# Patient Record
Sex: FEMALE | Race: WHITE | NOT HISPANIC OR LATINO | Employment: FULL TIME | URBAN - METROPOLITAN AREA
[De-identification: names, ages, dates, MRNs, and addresses within clinical notes are randomized per-mention and may not be internally consistent; named-entity substitution may affect disease eponyms.]

---

## 2017-01-09 ENCOUNTER — HOSPITAL ENCOUNTER (EMERGENCY)
Facility: CLINIC | Age: 22
Discharge: HOME | End: 2017-01-09
Attending: CLINICAL NURSE SPECIALIST | Admitting: CLINICAL NURSE SPECIALIST
Payer: COMMERCIAL

## 2017-01-09 VITALS
WEIGHT: 160 LBS | SYSTOLIC BLOOD PRESSURE: 147 MMHG | HEIGHT: 65 IN | TEMPERATURE: 98.6 F | RESPIRATION RATE: 16 BRPM | DIASTOLIC BLOOD PRESSURE: 76 MMHG | OXYGEN SATURATION: 99 % | BODY MASS INDEX: 26.66 KG/M2
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
## 2017-01-09 DIAGNOSIS — S16.1XXA CERVICAL STRAIN, INITIAL ENCOUNTER: ICD-10-CM

## 2017-01-09 PROCEDURE — 99282 EMERGENCY DEPT VISIT SF MDM: CPT

## 2017-01-09 RX ORDER — CYCLOBENZAPRINE HCL 10 MG
10 TABLET ORAL 3 TIMES DAILY PRN
Qty: 20 TABLET | Refills: 0 | Status: SHIPPED | OUTPATIENT
Start: 2017-01-09 | End: 2017-01-15

## 2017-01-09 ASSESSMENT — ENCOUNTER SYMPTOMS
NECK PAIN: 1
WEAKNESS: 0
NUMBNESS: 0
CONFUSION: 0
HEADACHES: 1
ABDOMINAL PAIN: 0
VOMITING: 0
BACK PAIN: 0

## 2017-01-09 NOTE — ED AVS SNAPSHOT
Emergency Department    [address] [facility] [address]    Phone:  [phone]    Fax:  [phone]                                       Arlene Cutler   MRN: [id number]    Department:   Emergency Department   Date of Visit:  1/9/2017           After Visit Summary Signature Page     I have received my discharge instructions, and my questions have been answered. I have discussed any challenges I see with this plan with the nurse or doctor.    ..........................................................................................................................................  Patient/Patient Representative Signature      ..........................................................................................................................................  Patient Representative Print Name and Relationship to Patient    ..................................................               ................................................  Date                                            Time    ..........................................................................................................................................  Reviewed by Signature/Title    ...................................................              ..............................................  Date                                                            Time
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
## 2017-01-10 ENCOUNTER — OFFICE VISIT (OUTPATIENT)
Dept: INTERNAL MEDICINE | Facility: CLINIC | Age: 22
End: 2017-01-10
Payer: COMMERCIAL

## 2017-01-10 VITALS
HEIGHT: 65 IN | DIASTOLIC BLOOD PRESSURE: 70 MMHG | WEIGHT: 160.1 LBS | OXYGEN SATURATION: 98 % | TEMPERATURE: 98.2 F | BODY MASS INDEX: 26.67 KG/M2 | SYSTOLIC BLOOD PRESSURE: 102 MMHG | HEART RATE: 92 BPM

## 2017-01-10 DIAGNOSIS — A60.00 RECURRENT GENITAL HERPES: ICD-10-CM

## 2017-01-10 DIAGNOSIS — Z23 NEED FOR PROPHYLACTIC VACCINATION AND INOCULATION AGAINST INFLUENZA: ICD-10-CM

## 2017-01-10 DIAGNOSIS — F41.9 ANXIETY: ICD-10-CM

## 2017-01-10 DIAGNOSIS — Z76.89 ENCOUNTER TO ESTABLISH CARE: Primary | ICD-10-CM

## 2017-01-10 DIAGNOSIS — R87.610 PAPANICOLAOU SMEAR OF CERVIX WITH ATYPICAL SQUAMOUS CELLS OF UNDETERMINED SIGNIFICANCE (ASC-US): ICD-10-CM

## 2017-01-10 PROCEDURE — 90688 IIV4 VACCINE SPLT 0.5 ML IM: CPT | Performed by: INTERNAL MEDICINE

## 2017-01-10 PROCEDURE — 99214 OFFICE O/P EST MOD 30 MIN: CPT | Performed by: INTERNAL MEDICINE

## 2017-01-10 RX ORDER — VALACYCLOVIR HYDROCHLORIDE 500 MG/1
500 TABLET, FILM COATED ORAL 2 TIMES DAILY
Qty: 6 TABLET | Refills: 4 | Status: SHIPPED | OUTPATIENT
Start: 2017-01-10 | End: 2018-01-23

## 2017-01-10 NOTE — MR AVS SNAPSHOT
"              After Visit Summary   1/10/2017    Arlene Cutler    MRN: [id number]           Patient Information     Date Of Birth          1995        Visit Information        Provider Department      1/10/2017 9:00 AM Bri To MD [facility]        Today's Diagnoses     Anxiety    -  1     Need for prophylactic vaccination and inoculation against influenza         Recurrent genital herpes           Care Instructions    Flu shot today.    ---    Refills of Zoloft 50mg sent to pharmacy.    Refills of Valtrex (one tablet twice a day for 3 days, 4 additional refills available).    ---    Come back this fall for repeat pap.         Follow-ups after your visit        Who to contact     If you have questions or need follow up information about today's clinic visit or your schedule please contact [facility] directly at [phone].  Normal or non-critical lab and imaging results will be communicated to you by MyChart, letter or phone within 4 business days after the clinic has received the results. If you do not hear from us within 7 days, please contact the clinic through MyChart or phone. If you have a critical or abnormal lab result, we will notify you by phone as soon as possible.  Submit refill requests through [employer] or call your pharmacy and they will forward the refill request to us. Please allow 3 business days for your refill to be completed.          Additional Information About Your Visit        MyChart Information     [employer] lets you send messages to your doctor, view your test results, renew your prescriptions, schedule appointments and more. To sign up, go to [email or web address] . Click on \"Log in\" on the left side of the screen, which will take you to the Welcome page. Then click on \"Sign up Now\" on the right side of the page.     You will be asked to enter the access code listed below, as well as some personal information. " "Please follow the directions to create your username and password.     Your access code is: [email or web address]  Expires: 2017  8:38 PM     Your access code will  in 90 days. If you need help or a new code, please call your [place] clinic or [phone].        Care EveryWhere ID     This is your Care EveryWhere ID. This could be used by other organizations to access your [place] medical records  [id number]        Your Vitals Were     Pulse Temperature Height    92 98.2  F (36.8  C) (Oral) 5' 4.5\" (1.638 m)    BMI (Body Mass Index) Pulse Oximetry Last Period    27.07 kg/m2 98% 2016 (Approximate)    Breastfeeding?          No         Blood Pressure from Last 3 Encounters:   01/10/17 102/70   17 147/76   08/15/16 130/77    Weight from Last 3 Encounters:   01/10/17 160 lb 1.6 oz (72.621 kg)   17 160 lb (72.576 kg)   08/15/16 162 lb 8 oz (73.71 kg)              We Performed the Following     FLU VACCINE, 3 YRS +, IM (QUADRIVALENT W/PRESERVATIVES/MULTI-DOSE) [58600]          Today's Medication Changes          These changes are accurate as of: 1/10/17  9:30 AM.  If you have any questions, ask your nurse or doctor.               Start taking these medicines.        Dose/Directions    sertraline 50 MG tablet   Commonly known as:  ZOLOFT   Used for:  Anxiety   Started by:  Bri To MD        Dose:  50 mg   Take 1 tablet (50 mg) by mouth daily   Quantity:  90 tablet   Refills:  3       valACYclovir 500 MG tablet   Commonly known as:  VALTREX   Used for:  Recurrent genital herpes   Started by:  Bri To MD        Dose:  500 mg   Take 1 tablet (500 mg) by mouth 2 times daily   Quantity:  6 tablet   Refills:  4            Where to get your medicines      These medications were sent to [facility] [address] [place], [place] - [address] AT [facility]  [address], [facility] [address]     Phone:  [phone]    - sertraline 50 MG " tablet  - valACYclovir 500 MG tablet             Primary Care Provider Office Phone # Fax #    Elsie Bridgett Barragan [phone]       [facility] [address] [facility] [address]        Thank you!     Thank you for choosing [facility]  for your care. Our goal is always to provide you with excellent care. Hearing back from our patients is one way we can continue to improve our services. Please take a few minutes to complete the written survey that you may receive in the mail after your visit with us. Thank you!             Your Updated Medication List - Protect others around you: Learn how to safely use, store and throw away your medicines at www.disposemymeds.org.          This list is accurate as of: 1/10/17  9:30 AM.  Always use your most recent med list.                   Brand Name Dispense Instructions for use    cyclobenzaprine 10 MG tablet    FLEXERIL    20 tablet    Take 1 tablet (10 mg) by mouth 3 times daily as needed for muscle spasms       doxycycline 100 MG ED starter pack    VIBRA-TAB     Take 1 tablet by mouth once       hydrOXYzine 25 MG tablet    ATARAX    60 tablet    Take 1-2 tablets (25-50 mg) by mouth every 6 hours as needed for itching       norgestrel-ethinyl estradiol 0.3-30 MG-MCG per tablet    LO/OVRAL    84 tablet    Take 1 tablet by mouth daily       sertraline 50 MG tablet    ZOLOFT    90 tablet    Take 1 tablet (50 mg) by mouth daily       valACYclovir 500 MG tablet    VALTREX    6 tablet    Take 1 tablet (500 mg) by mouth 2 times daily

## 2017-01-10 NOTE — NURSING NOTE
"Chief Complaint   Patient presents with     Establish Care     Derm Problem     x 3 days. Herpes lesions on B/L buttocks. Would like to discuss further options on preventive measures for it.       Initial /70 mmHg  Pulse 92  Temp(Src) 98.2  F (36.8  C) (Oral)  Ht 5' 4.5\" (1.638 m)  Wt 160 lb 1.6 oz (72.621 kg)  BMI 27.07 kg/m2  SpO2 98%  LMP 12/27/2016 (Approximate)  Breastfeeding? No Estimated body mass index is 27.07 kg/(m^2) as calculated from the following:    Height as of this encounter: 5' 4.5\" (1.638 m).    Weight as of this encounter: 160 lb 1.6 oz (72.621 kg).  BP completed using cuff size: regular    Kaminibose MA      "

## 2017-01-10 NOTE — PATIENT INSTRUCTIONS
Flu shot today.    ---    Refills of Zoloft 50mg sent to pharmacy.    Refills of Valtrex (one tablet twice a day for 3 days, 4 additional refills available).    ---    Come back this fall for repeat pap.

## 2017-01-10 NOTE — PROGRESS NOTES
SUBJECTIVE:                                                      HPI: Arlene Cutler is a pleasant 21 year old female who presents to establish care:    Is concerned about herpes:  - primary genital herpes infection occurred last spring  - since then has had recurrent herpetic lesions on bilateral buttocks   - has occurred 4 times   - significantly more mild than primary herpes infection   - has not been treated for any of these  - is interested in treatment options and how to avoid outbreaks     Also, was trying to wean off of Zoloft last fall - unable to do so:  - on this for anxiety  - would like to stay on 50mg daily - reports that anxiety well-controlled at this dose  - needs refills    Past Medical History   Diagnosis Date     Anxiety      Atypical squamous cells of undetermined significance (ASCUS) on Papanicolaou smear of cervix 08/15/16     repeat in 1 year     Recurrent genital herpes 2016     on PRN treatment; considering chronic suppressive therapy   Re: anxiety: see above   Re: ASCUS - repeat PAP due this fall  Re: recurrent genital herpes: see above    Past Surgical History   Procedure Laterality Date     No history of surgery       Family History   Problem Relation Age of Onset     Type 2 Diabetes Maternal Grandmother      Diet controlled     Hypertension Maternal Grandfather      Hyperlipidemia Paternal Grandmother      Myocardial Infarction No family hx of      CEREBROVASCULAR DISEASE No family hx of      Pacemaker Paternal Grandfather      Coronary Artery Disease Early Onset No family hx of      Colon Cancer Paternal Grandfather      Breast Cancer No family hx of      Ovarian Cancer No family hx of      Social History Main Topics     Smoking status: Never Smoker      Smokeless tobacco: Never Used     Alcohol Use: Yes      Comment: couple drinks/week     Drug Use: No     Sexual Activity:     Partners: Male     Birth Control/ Protection: Condom, OCP     Social History Narrative    Single.     "In college - majoring in kinesiology; hoping to go to grad school in OT.    Working at [employer] while in school.     On Lacrosse team at school.     Allergies   Allergen Reactions     No Known Allergies      Current Outpatient Prescriptions   Medication Sig     sertraline (ZOLOFT) 50 MG tablet Take 1 tablet (50 mg) by mouth daily     valACYclovir (VALTREX) 500 MG tablet Take 1 tablet (500 mg) by mouth 2 times daily     cyclobenzaprine (FLEXERIL) 10 MG tablet Take 1 tablet (10 mg) by mouth 3 times daily as needed for muscle spasms     hydrOXYzine (ATARAX) 25 MG tablet Take 1-2 tablets (25-50 mg) by mouth every 6 hours as needed for itching     norgestrel-ethinyl estradiol (LO/OVRAL) 0.3-30 MG-MCG per tablet Take 1 tablet by mouth daily     doxycycline (VIBRA-TAB) 100 MG Take 1 tablet by mouth once     Immunization History   Administered Date(s) Administered     DPT 1994, 01/19/1995     DTAP (<7y) 09/03/1999     Hepatitis A Vac Ped/Adol-2 Dose 08/23/2012, 04/15/2013     Hepatitis B 1994, 01/19/1995, 06/15/1995     Human Papilloma Virus 01/31/2013, 04/15/2013, 08/09/2013     Influenza (IIV3) 02/19/2007     MMR 12/15/1995, 09/03/1999     Meningococcal (Menactra ) 02/19/2007, 08/23/2012     OPV 1994, 01/19/1995, 06/05/1995, 09/03/1999     TDAP (ADACEL AGES 11-64) 08/23/2012     TDAP (BOOSTRIX AGES 10-64) 02/19/2007     Tetramune (DtP/HIB) 03/16/1995, 12/15/1995     Varicella Not Indicated - By Hx 01/20/2000     OBJECTIVE:                                                      /70 mmHg  Pulse 92  Temp(Src) 98.2  F (36.8  C) (Oral)  Ht 5' 4.5\" (1.638 m)  Wt 160 lb 1.6 oz (72.621 kg)  BMI 27.07 kg/m2  SpO2 98%  LMP 12/27/2016 (Approximate)  Breastfeeding? No  Constitutional: well-appearing    PREVENTATIVE HEALTH                                                      BMI: overweight  Blood pressure: within normal limits   Mammogram: not medically indicated at this time   Pap: see " above  Colonoscopy: not medically indicated at this time   Dexa: not medically indicated at this time   Screening HCV: not medically indicated at this time   Screening cholesterol: not medically indicated at this time   Screening diabetes: not medically indicated at this time   STD testing: no risk factors present  Depression screening: PHQ-2 assessment completed and reviewed - no intervention indicated at this time  Alcohol misuse screening: alcohol use reviewed - no intervention indicated at this time  Immunizations: reviewed; flu shot DUE    ASSESSMENT/PLAN:                                                       (Z76.89) Encounter to establish care  (primary encounter diagnosis)  Comment: PMH, PSH, FH, SH, medications, allergies, immunizations, and preventative health measures reviewed.   Plan: see below for plans.     (A60.00) Recurrent genital herpes  Comment: primary outbreak ~1 year ago; has had 4 breakouts (on bilateral buttocks) since.  Plan:    - discussed PRN vs. Chronic suppressive Rx.    - for now patient would like to proceed with PRN therapy.     - Valtrex 500mg bid x 3 days with 4 additional refills provided.    - if patient goes through all 5 refills within year, then chronic suppressive therapy may be better option.   - discussed with patient that the most common triggers for herpes outbreaks are illness and stress.    - difficult to avoid these, but managing anxiety and good hand hygiene can help.    - patient advised to avoid sexual contact with others while lesions present.    (F41.9) Anxiety  Comment: patient was not able to wean off of Zoloft during fall; would like to stay at 50mg daily.   Plan: refill provided.     (Z23) Need for prophylactic vaccination and inoculation against influenza  Plan: flu shot given today.     (R87.610) Papanicolaou smear of cervix with atypical squamous cells of undetermined significance (ASC-US)  Plan: repeat pap this fall.     The instructions on the AVS were  discussed and explained to the patient. Patient expressed understanding of instructions.    A total of 25 minutes were spent face-to-face with this patient during this encounter and over half of that time was spent on counseling and coordination of care re: above diagnoses and plans of care.     Bri To MD   [address] [address]  T: [phone], F: [phone]
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
## 2017-01-10 NOTE — PROGRESS NOTES
Injectable Influenza Immunization Documentation    1.  Is the person to be vaccinated sick today?  No    2. Does the person to be vaccinated have an allergy to eggs or to a component of the vaccine?  No    3. Has the person to be vaccinated today ever had a serious reaction to influenza vaccine in the past?  No    4. Has the person to be vaccinated ever had Guillain-Seymour syndrome?  No     Form completed by Gibson LONGORIA

## 2017-09-16 ENCOUNTER — HEALTH MAINTENANCE LETTER (OUTPATIENT)
Age: 22
End: 2017-09-16

## 2017-10-01 DIAGNOSIS — N92.6 IRREGULAR PERIODS: ICD-10-CM

## 2017-10-03 DIAGNOSIS — N92.6 IRREGULAR PERIODS: ICD-10-CM

## 2017-10-03 RX ORDER — NORGESTREL AND ETHINYL ESTRADIOL 0.3-0.03MG
KIT ORAL
Qty: 30 TABLET | Refills: 2 | Status: SHIPPED | OUTPATIENT
Start: 2017-10-03 | End: 2017-12-18

## 2017-10-03 RX ORDER — NORGESTREL AND ETHINYL ESTRADIOL 0.3-0.03MG
KIT ORAL
Qty: 84 TABLET | Refills: 2 | OUTPATIENT
Start: 2017-10-03

## 2017-10-03 NOTE — TELEPHONE ENCOUNTER
norgestrel-ethinyl estradiol (LO/OVRAL) 0.3-30 MG-MCG per tablet      Last Written Prescription Date: 8/15/16  Last Fill Quantity: 84, # refills: 4  [facility] prescribing provider:   1/10/17   Pt DUE for next PE Jan 2018    BP Readings from Last 3 Encounters:   01/10/17 102/70   01/09/17 147/76   08/15/16 130/77       (R87.610) Papanicolaou smear of cervix with atypical squamous cells of undetermined significance (ASC-US)  Plan: repeat pap this fall.     Prescription approved for 3 months per [facility] Refill Protocol.

## 2017-12-18 DIAGNOSIS — N92.6 IRREGULAR PERIODS: ICD-10-CM

## 2017-12-18 NOTE — LETTER
[facility]  [address] [id number] [phone]            Arlene Link Omayra  [address]  [facility] [address]        December 19, 2017    Dear Arlene,    While refilling your prescription today, we noticed that you are due for an appointment with your provider.  We will refill your prescription for 30 days, but a follow-up appointment must be made before any additional refills can be approved.     Taking care of your health is important to us and we look forward to seeing you in the near future.  Please call us at [phone] or [phone] (or use Net[employer]enish) to schedule an appointment.     Please disregard this notice if you have already made an appointment.    Sincerely,        [facility]

## 2017-12-19 RX ORDER — NORGESTREL AND ETHINYL ESTRADIOL 0.3-0.03MG
KIT ORAL
Qty: 28 TABLET | Refills: 0 | Status: SHIPPED | OUTPATIENT
Start: 2017-12-19 | End: 2018-01-10

## 2017-12-19 NOTE — TELEPHONE ENCOUNTER
Medication is being filled for 1 time refill only due to:  Patient needs to be seen because it has been more than one year since last visit. letter sent

## 2017-12-26 DIAGNOSIS — F41.9 ANXIETY: ICD-10-CM

## 2018-01-10 DIAGNOSIS — N92.6 IRREGULAR PERIODS: ICD-10-CM

## 2018-01-10 RX ORDER — NORGESTREL AND ETHINYL ESTRADIOL 0.3-0.03MG
KIT ORAL
Qty: 28 TABLET | Refills: 0 | Status: SHIPPED | OUTPATIENT
Start: 2018-01-10 | End: 2018-01-23

## 2018-01-10 NOTE — LETTER
[facility]  [address] [id number] [phone]            Arlene Wheater  [address]  [facility] [address]        January 10, 2018    Dear Arlene,    While refilling your prescription today, we noticed that you are due for an appointment with your provider.  We will refill your prescription for 30 days, but a follow-up appointment must be made before any additional refills can be approved.     Taking care of your health is important to us and we look forward to seeing you in the near future.  Please call us at [phone] or [phone] (or use Say2me) to schedule an appointment.     Please disregard this notice if you have already made an appointment.    Sincerely,        [facility]

## 2018-01-23 ENCOUNTER — RESULT FOLLOW UP (OUTPATIENT)
Dept: INTERNAL MEDICINE | Facility: CLINIC | Age: 23
End: 2018-01-23

## 2018-01-23 ENCOUNTER — OFFICE VISIT (OUTPATIENT)
Dept: INTERNAL MEDICINE | Facility: CLINIC | Age: 23
End: 2018-01-23
Payer: COMMERCIAL

## 2018-01-23 VITALS
HEIGHT: 65 IN | BODY MASS INDEX: 28.67 KG/M2 | WEIGHT: 172.1 LBS | DIASTOLIC BLOOD PRESSURE: 60 MMHG | OXYGEN SATURATION: 98 % | SYSTOLIC BLOOD PRESSURE: 120 MMHG | HEART RATE: 80 BPM | TEMPERATURE: 98.7 F

## 2018-01-23 DIAGNOSIS — A60.00 RECURRENT GENITAL HERPES: ICD-10-CM

## 2018-01-23 DIAGNOSIS — Z11.3 SCREENING EXAMINATION FOR VENEREAL DISEASE: ICD-10-CM

## 2018-01-23 DIAGNOSIS — R87.610 PAPANICOLAOU SMEAR OF CERVIX WITH ATYPICAL SQUAMOUS CELLS OF UNDETERMINED SIGNIFICANCE (ASC-US): ICD-10-CM

## 2018-01-23 DIAGNOSIS — Z00.01 ENCOUNTER FOR ROUTINE ADULT MEDICAL EXAM WITH ABNORMAL FINDINGS: Primary | ICD-10-CM

## 2018-01-23 DIAGNOSIS — F41.9 ANXIETY: ICD-10-CM

## 2018-01-23 DIAGNOSIS — Z23 NEED FOR PROPHYLACTIC VACCINATION AND INOCULATION AGAINST INFLUENZA: ICD-10-CM

## 2018-01-23 DIAGNOSIS — Z12.4 SCREENING FOR CERVICAL CANCER: ICD-10-CM

## 2018-01-23 DIAGNOSIS — Z30.41 ENCOUNTER FOR BIRTH CONTROL PILLS MAINTENANCE: ICD-10-CM

## 2018-01-23 PROCEDURE — 99213 OFFICE O/P EST LOW 20 MIN: CPT | Mod: 25 | Performed by: INTERNAL MEDICINE

## 2018-01-23 PROCEDURE — 99395 PREV VISIT EST AGE 18-39: CPT | Mod: 25 | Performed by: INTERNAL MEDICINE

## 2018-01-23 PROCEDURE — 90686 IIV4 VACC NO PRSV 0.5 ML IM: CPT | Performed by: INTERNAL MEDICINE

## 2018-01-23 PROCEDURE — 87591 N.GONORRHOEAE DNA AMP PROB: CPT | Performed by: INTERNAL MEDICINE

## 2018-01-23 PROCEDURE — 90471 IMMUNIZATION ADMIN: CPT | Performed by: INTERNAL MEDICINE

## 2018-01-23 PROCEDURE — G0124 SCREEN C/V THIN LAYER BY MD: HCPCS | Performed by: INTERNAL MEDICINE

## 2018-01-23 PROCEDURE — G0145 SCR C/V CYTO,THINLAYER,RESCR: HCPCS | Performed by: INTERNAL MEDICINE

## 2018-01-23 PROCEDURE — 87491 CHLMYD TRACH DNA AMP PROBE: CPT | Performed by: INTERNAL MEDICINE

## 2018-01-23 RX ORDER — LIDOCAINE 50 MG/G
OINTMENT TOPICAL
Refills: 1 | COMMUNITY
Start: 2017-04-04 | End: 2019-01-25

## 2018-01-23 RX ORDER — VALACYCLOVIR HYDROCHLORIDE 500 MG/1
500 TABLET, FILM COATED ORAL DAILY
Qty: 90 TABLET | Refills: 3 | Status: SHIPPED | OUTPATIENT
Start: 2018-01-23 | End: 2019-01-14

## 2018-01-23 RX ORDER — SERTRALINE HYDROCHLORIDE 25 MG/1
25 TABLET, FILM COATED ORAL DAILY
Qty: 90 TABLET | Refills: 0 | Status: SHIPPED | OUTPATIENT
Start: 2018-01-23 | End: 2018-04-18

## 2018-01-23 RX ORDER — HYDROXYZINE HYDROCHLORIDE 25 MG/1
25-50 TABLET, FILM COATED ORAL EVERY 6 HOURS PRN
Qty: 60 TABLET | Refills: 0 | Status: SHIPPED | OUTPATIENT
Start: 2018-01-23 | End: 2021-06-23

## 2018-01-23 RX ORDER — ADAPALENE 45 G/G
GEL TOPICAL
COMMUNITY
Start: 2017-04-10

## 2018-01-23 ASSESSMENT — PATIENT HEALTH QUESTIONNAIRE - PHQ9
SUM OF ALL RESPONSES TO PHQ QUESTIONS 1-9: 2
5. POOR APPETITE OR OVEREATING: SEVERAL DAYS

## 2018-01-23 ASSESSMENT — ANXIETY QUESTIONNAIRES
2. NOT BEING ABLE TO STOP OR CONTROL WORRYING: SEVERAL DAYS
6. BECOMING EASILY ANNOYED OR IRRITABLE: SEVERAL DAYS
5. BEING SO RESTLESS THAT IT IS HARD TO SIT STILL: NOT AT ALL
3. WORRYING TOO MUCH ABOUT DIFFERENT THINGS: MORE THAN HALF THE DAYS
7. FEELING AFRAID AS IF SOMETHING AWFUL MIGHT HAPPEN: SEVERAL DAYS
GAD7 TOTAL SCORE: 8
1. FEELING NERVOUS, ANXIOUS, OR ON EDGE: MORE THAN HALF THE DAYS

## 2018-01-23 NOTE — PATIENT INSTRUCTIONS
For recurrent genital herpes recommend DAILY Valtrex 500mg.    ---    To start wean off of Zoloft:    DECREASE dose to 25mg daily x 3 months.    If still doing okay, then we can decrease again to 12.5mg daily.    ---    Refills of hydroxyzine and birth control pill sent to pharmacy.    ---    Pap smear results take ~1 week to come back.

## 2018-01-23 NOTE — LETTER
January 10, 2020      Arlene Cutler  [address]  [facility] [address]    Dear ,      This letter is to remind you that you are due for your follow up PAP smear on or about 01/25/20.    Please call [phone] to schedule your appointment at your earliest convenience.     If you have completed the tests outside of [place], please have the results forwarded to our office. We will update the chart for your primary Physician to review before your next annual physical.     Sincerely,      Your [place] Care Team //[facility]

## 2018-01-23 NOTE — LETTER
01/25/18      Arlene Cutler  [address] [facility] [address]        Dear ,    It was a pleasure seeing you again the other day! I hope this finds you well.    I wanted to let you know that your screens for gonorrhea and chlamydia came back as negative.    Please feel free to contact me with any questions or concerns.      Take care,    Bri To MD   [facility]  [address] [place] [place] [address]  T: [phone], F: [phone]

## 2018-01-23 NOTE — PROGRESS NOTES

## 2018-01-23 NOTE — LETTER
January 31, 2018      Arlene Cutler  [address]  [facility] [address]    Dear ,      This letter is in regards to your recent cervical cancer screening (Pap smear). Your Pap smear result was reported as ASCUS or Atypical Squamous Cells of Undetermined Significance. This means that there were mildly abnormal cells found in the sample that we collected from your cervix, but no cancer cells were found. The vast majority of patients with this result do not have significant cervical abnormalities.     Therefore, current guidelines recommend that you return in one year to repeat your Pap smear.      If you have questions about these results contact [phone].    Sincerely,      Bri To MD/[place]

## 2018-01-24 ASSESSMENT — ANXIETY QUESTIONNAIRES: GAD7 TOTAL SCORE: 8

## 2018-01-24 NOTE — PROGRESS NOTES
"  SUBJECTIVE:                                                      HPI: Arlene Cutler is a pleasant 22 year old female who presents for a physical.    Having difficulty with recurrent genital herpes:  - has been using Valtrex as needed on a frequent basis  - outbreaks not completely suppressed with Valtrex as needed - outbreak is prolonged though low grade  - in sexually active relationship with male partner - wants to reduce risk of passing virus to him    Patient would also like to wean off of Zoloft:  - has been using since high school (~4-5 years) for anxiety  - has tried weaning off in the past - unsuccessful  - feels like she is \"in a good place in life\" and is ready to retry coming off again    Discussed with patient that because she has been on the medication for several years, it is best to wean off slowly over ~6 months (or longer if needed).     Also needs refills of hydroxyzine (as needed for anxiety) and OCP.     ROS:  Constitutional: denies unintentional weight loss or gain; denies fevers, chills, or sweats     Cardiovascular: denies chest pain, palpitations, or edema  Respiratory: denies cough, wheezing, shortness of breath, or dyspnea on exertion  Gastrointestinal: denies nausea, vomiting, constipation, diarrhea, or abdominal pain  Genitourinary: denies urinary frequency, urgency, dysuria, or hematuria  Integumentary: denies rash or pruritus  Musculoskeletal: denies back pain, muscle pain, joint pain, or joint swelling  Neurologic: denies focal weakness, numbness, or tingling  Hematologic/Immunologic: denies history of anemia or blood transfusions  Endocrine: denies heat or cold intolerance; denies polyuria, polydipsia  Psychiatric: see PMH below    Past Medical History:   Diagnosis Date     Anxiety      Recurrent genital herpes      Past Surgical History:   Procedure Laterality Date     NO HISTORY OF SURGERY       Family History   Problem Relation Age of Onset     Type 2 Diabetes Maternal " Grandmother      Diet controlled     Hypertension Maternal Grandfather      Hyperlipidemia Paternal Grandmother      Pacemaker Paternal Grandfather      Colon Cancer Paternal Grandfather      Breast Cancer Maternal Aunt      Myocardial Infarction No family hx of      CEREBROVASCULAR DISEASE No family hx of      Coronary Artery Disease Early Onset No family hx of      Ovarian Cancer No family hx of      Social History Main Topics     Smoking status: Never Smoker     Smokeless tobacco: Never Used     Alcohol use Yes      Comment: couple drinks/week     Drug use: No     Sexual activity: Yes     Partners: Male     Birth control/ protection: Condom, OCP     Social History Narrative    Dating.    Hoping to go into PT.    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          Allergies   Allergen Reactions     No Known Allergies      Current Outpatient Prescriptions   Medication Sig     adapalene (DIFFERIN) 0.1 % gel      lidocaine (XYLOCAINE) 5 % ointment MOLLY TOPICALLY TID     valACYclovir (VALTREX) 500 MG tablet Take 1 tablet (500 mg) by mouth daily     norgestrel-ethinyl estradiol (LOW-OGESTREL) 0.3-30 MG-MCG per tablet Take 1 tablet by mouth daily     hydrOXYzine (ATARAX) 25 MG tablet Take 1-2 tablets (25-50 mg) by mouth every 6 hours as needed for anxiety     sertraline (ZOLOFT) 50 MG tablet TAKE 1 TABLET(50 MG) BY MOUTH DAILY     valACYclovir (VALTREX) 500 MG tablet Take 1 tablet (500 mg) by mouth 2 times daily     Immunization History   Administered Date(s) Administered     DTAP (<7y) 09/03/1999     HEPA 08/23/2012, 04/15/2013     HPV 01/31/2013, 04/15/2013, 08/09/2013     HepB 1994, 01/19/1995, 06/15/1995     Historical DTP/aP 1994, 01/19/1995     Influenza (IIV3) PF 02/19/2007     Influenza Vaccine, 3 YRS +, IM (QUADRIVALENT W/PRESERVATIVES) 01/10/2017     MMR 12/15/1995, 09/03/1999     Meningococcal (Menactra ) 02/19/2007, 08/23/2012     OPV, trivalent, live 1994, 01/19/1995, 06/05/1995, 09/03/1999  "    TDAP Vaccine (Adacel) 08/23/2012     TDAP Vaccine (Boostrix) 02/19/2007     Tetramune (DtP/HIB) 03/16/1995, 12/15/1995     Varicella Pt Report Hx of Varicella/Chicken Pox 01/20/2000     OBJECTIVE:                                                      /60 (BP Location: Left arm, Patient Position: Chair, Cuff Size: Adult Regular)  Pulse 80  Temp 98.7  F (37.1  C) (Oral)  Ht 5' 4.8\" (1.646 m)  Wt 172 lb 1.6 oz (78.1 kg)  LMP 01/02/2018 (Approximate)  SpO2 98%  BMI 28.82 kg/m2  Constitutional: well-appearing  Eyes: normal conjunctivae and lids; pupils equal, round, and reactive to light  Ears, Nose, Mouth, and Throat: normal ears and nose; tympanic membranes visualized and normal; normal lips, teeth, and gums; no oropharyngeal lesions or ulcers  Neck: supple and symmetric; no lymphadenopathy; no thyromegaly or masses  Respiratory: normal respiratory effort; clear to auscultation bilaterally  Cardiovascular: regular rate and rhythm; pedal pulses palpable; no edema  Gastrointestinal: soft, non-tender, non-distended, and bowel sounds present; no organomegaly or masses  Genitourinary: external genitalia, urethral meatus, and vagina normal; cervix visualized and normal in appearance  Musculoskeletal: normal gait and station  Psych: normal judgment and insight; normal mood and affect; recent and remote memory intact; oriented to time, place, and person    PREVENTATIVE HEALTH                                                      BMI: overweight  Blood pressure: within normal limits   Breast CA screening: not medically indicated at this time   Cervical CA screening: DUE - ASCUS in 2016  Colon CA screening: not medically indicated at this time   Lung CA screening: n/a   Dexa: not medically indicated at this time   Screening HCV: n/a   Screening cholesterol: not medically indicated at this time   Screening diabetes: not medically indicated at this time   STD testing: DUE  Depression screening: PHQ-2 assessment " completed and reviewed - no intervention indicated at this time  Alcohol misuse screening: alcohol use reviewed - no intervention indicated at this time  Immunizations: reviewed; flu shot DUE    ASSESSMENT/PLAN:                                                       (Z00.01) Encounter for routine adult medical exam with abnormal findings  (primary encounter diagnosis)  Comment: PMH, PSH, FH, SH, medications, allergies, immunizations, and preventative health measures reviewed.   Plan: see below for plans.     (Z12.4) Screening for cervical cancer  (R87.610) Papanicolaou smear of cervix with atypical squamous cells of undetermined significance (ASC-US)  Plan: pap smear obtained today.     (Z23) Need for prophylactic vaccination and inoculation against influenza  Plan: flu shot given today.     (Z11.3) Screening examination for venereal disease  Comment: asymptomatic.   Plan: swab for GC/C obtained.     (A60.00) Recurrent genital herpes  Comment:    - suboptimal control with Valtrex as needed.    - recommend chronic suppressive therapy.   Plan: START Valtrex 500mg daily continuously.     (F41.9) Anxiety  Comment:    - patient would like to try to come off Zoloft.    - recommend slow wean in light of length of time she has been on medication.   Plan:    - DECREASE Zoloft from 50 to 25mg x 3 months.   - if still doing well after 3 months, recommend decreasing to 12.5mg daily.   - refill of hydroxyzine provided.    - if needing frequently, should consider re-increasing Zoloft.     (Z30.41) Encounter for birth control pills maintenance  Plan: refill of OCP provided.     The instructions on the AVS were discussed and explained to the patient. Patient expressed understanding of instructions.    (Chart documentation was completed, in part, with [employer] voice-recognition software. Even though reviewed, some grammatical, spelling, and word errors may remain.)    Bri To MD   [facility] -  [address] [place] [address]  T: [phone], F: [phone]

## 2018-01-25 LAB
C TRACH DNA SPEC QL NAA+PROBE: NEGATIVE
N GONORRHOEA DNA SPEC QL NAA+PROBE: NEGATIVE
SPECIMEN SOURCE: NORMAL
SPECIMEN SOURCE: NORMAL

## 2018-01-26 LAB
COPATH REPORT: ABNORMAL
PAP: ABNORMAL

## 2018-01-29 PROBLEM — R87.610 PAPANICOLAOU SMEAR OF CERVIX WITH ATYPICAL SQUAMOUS CELLS OF UNDETERMINED SIGNIFICANCE (ASC-US): Status: ACTIVE | Noted: 2018-01-23

## 2018-01-29 NOTE — PROGRESS NOTES
08/15/16: Ascus pap. Plan pap in 1 year.  01/23/18: Ascus pap in a 21 yo. Plan pap in 1 year. Pt was advised.  01/31/18 Result letter sent at request of RN. ([facility])   1/25/19 Dx NIL pap. Plan 1 year pap ([facility])  01/10/20 MyChart pap reminder message sent. ([facility])  1/29/20 NIL pap. Plan 3 year pap ([facility]aldo)

## 2018-04-18 DIAGNOSIS — F41.9 ANXIETY: ICD-10-CM

## 2018-04-18 RX ORDER — SERTRALINE HYDROCHLORIDE 25 MG/1
12.5 TABLET, FILM COATED ORAL DAILY
Qty: 45 TABLET | Refills: 0 | Status: SHIPPED | OUTPATIENT
Start: 2018-04-18 | End: 2018-07-12

## 2018-04-18 NOTE — TELEPHONE ENCOUNTER
Per last office visit:    Plan:                           - DECREASE Zoloft from 50 to 25mg x 3 months.                          - if still doing well after 3 months, recommend decreasing to 12.5mg daily.                          - refill of hydroxyzine provided.                                                  - if needing frequently, should consider re-increasing Zoloft.     Pt is doing well on 25mg dose and is ready to decrease to 12.5mg.  Needs new script sent to pharmacy.

## 2018-04-18 NOTE — TELEPHONE ENCOUNTER
Left message for patient to call back.  Per last office visit (1/23), patient was weaning off Zoloft.

## 2018-05-10 ENCOUNTER — OFFICE VISIT (OUTPATIENT)
Dept: INTERNAL MEDICINE | Facility: CLINIC | Age: 23
End: 2018-05-10
Payer: COMMERCIAL

## 2018-05-10 VITALS
TEMPERATURE: 98.1 F | WEIGHT: 167.2 LBS | BODY MASS INDEX: 28 KG/M2 | SYSTOLIC BLOOD PRESSURE: 110 MMHG | DIASTOLIC BLOOD PRESSURE: 72 MMHG | OXYGEN SATURATION: 97 % | HEART RATE: 102 BPM

## 2018-05-10 DIAGNOSIS — A08.4 VIRAL GASTROENTERITIS: ICD-10-CM

## 2018-05-10 DIAGNOSIS — R19.7 DIARRHEA, UNSPECIFIED TYPE: Primary | ICD-10-CM

## 2018-05-10 PROCEDURE — 99213 OFFICE O/P EST LOW 20 MIN: CPT | Performed by: PHYSICIAN ASSISTANT

## 2018-05-10 NOTE — PROGRESS NOTES
SUBJECTIVE:   Arlene Cutler is a 22 year old female who presents to clinic today for the following health issues:    Patient not taking birth control, has cut sertraline dose in half    Diarrhea  Onset: Yesterday    Description:   Consistency of stool: watery, runny and yellow  Blood in stool: no   Number of loose stools in past 24 hours: 10    Progression of Symptoms:  same    Accompanying Signs & Symptoms:  Fever: no   Nausea or vomiting; no   Abdominal pain: YES- More like discomfort  Episodes of constipation: no   Weight loss: no     History:   Ill contacts: no   Recent use of antibiotics: no    Recent travels: no          Recent medication-new or changes(Rx or OTC): no     Precipitating factors:   Eating    Alleviating factors:   Just been trying to stay hydrated    Patient works at a [employer].   She did have a salad yesterday , but was a spring mix salad  No family sick contacts  No adventurous eating. No foreign travel     Therapies Tried and outcome:  None; Outcome: None    -------------------------------------    Problem list and histories reviewed & adjusted, as indicated.  Additional history: as documented    Labs reviewed in EPIC    Reviewed and updated as needed this visit by clinical staff       Reviewed and updated as needed this visit by Provider  Allergies  Meds         ROS:  Constitutional, HEENT, cardiovascular, pulmonary, gi and gu systems are negative, except as otherwise noted.    OBJECTIVE:     /72 (BP Location: Left arm, Patient Position: Chair, Cuff Size: Adult Large)  Pulse 102  Temp 98.1  F (36.7  C) (Oral)  Wt 167 lb 3.2 oz (75.8 kg)  LMP 05/03/2018 (Exact Date)  SpO2 97%  BMI 28 kg/m2  Body mass index is 28 kg/(m^2).  GENERAL: healthy, alert and no distress  NECK: no adenopathy, no asymmetry, masses, or scars and thyroid normal to palpation  RESP: lungs clear to auscultation - no rales, rhonchi or wheezes  CV: regular rates and rhythm and normal S1 S2, no S3 or  S4  ABDOMEN: soft, nontender, no hepatosplenomegaly, no masses and bowel sounds normal  SKIN: no suspicious lesions or rashes    Diagnostic Test Results:  none     ASSESSMENT/PLAN:               ICD-10-CM    1. Diarrhea, unspecified type R19.7    2. Viral gastroenteritis A08.4        Reviewed symptoms - diarrhea x 24 hours only.  Reviewed if not improving in the next few days or new symptoms Fevers Vomitting abdominal pain then recheck.       Maggy Vargas PA-C  [facility]

## 2018-05-10 NOTE — MR AVS SNAPSHOT
After Visit Summary   5/10/2018    Arlene Cutler    MRN: [id number]           Patient Information     Date Of Birth          1995        Visit Information        Provider Department      5/10/2018 3:00 PM Maggy Vargas PA-C [facility]        Today's Diagnoses     Diarrhea, unspecified type    -  1    Viral gastroenteritis           Follow-ups after your visit        Who to contact     If you have questions or need follow up information about today's clinic visit or your schedule please contact [facility] directly at [phone].  Normal or non-critical lab and imaging results will be communicated to you by MyChart, letter or phone within 4 business days after the clinic has received the results. If you do not hear from us within 7 days, please contact the clinic through [employer] or phone. If you have a critical or abnormal lab result, we will notify you by phone as soon as possible.  Submit refill requests through [employer] or call your pharmacy and they will forward the refill request to us. Please allow 3 business days for your refill to be completed.          Additional Information About Your Visit        MyChart Information     [employer] gives you secure access to your electronic health record. If you see a primary care provider, you can also send messages to your care team and make appointments. If you have questions, please call your primary care clinic.  If you do not have a primary care provider, please call [phone] and they will assist you.        Care EveryWhere ID     This is your Care EveryWhere ID. This could be used by other organizations to access your [place] medical records  [id number]        Your Vitals Were     Pulse Temperature Last Period Pulse Oximetry BMI (Body Mass Index)       102 98.1  F (36.7  C) (Oral) 05/03/2018 (Exact Date) 97% 28 kg/m2        Blood Pressure from Last 3 Encounters:   05/10/18  110/72   01/23/18 120/60   01/10/17 102/70    Weight from Last 3 Encounters:   05/10/18 167 lb 3.2 oz (75.8 kg)   01/23/18 172 lb 1.6 oz (78.1 kg)   01/10/17 160 lb 1.6 oz (72.6 kg)              Today, you had the following     No orders found for display       Primary Care Provider Office Phone # Fax #    Bri To [phone]       [address] [facility] [address]        Equal Access to Services     [facility]: Hadii aad ku hadasho Soomaali, waaxda luqadaha, qaybta kaalmada adeegyada, mejia louis hayrio adekavita nunez . So [facility] [phone].    ATENCIÓN: Si habla español, tiene a mccray disposición servicios gratuitos de asistencia lingüística. [facility] [phone].    We comply with applicable federal civil rights laws and [place] laws. We do not discriminate on the basis of race, color, national origin, age, disability, sex, sexual orientation, or gender identity.            Thank you!     Thank you for choosing [facility]  for your care. Our goal is always to provide you with excellent care. Hearing back from our patients is one way we can continue to improve our services. Please take a few minutes to complete the written survey that you may receive in the mail after your visit with us. Thank you!             Your Updated Medication List - Protect others around you: Learn how to safely use, store and throw away your medicines at www.disposemymeds.org.          This list is accurate as of 5/10/18  3:25 PM.  Always use your most recent med list.                   Brand Name Dispense Instructions for use Diagnosis    adapalene 0.1 % gel    DIFFERIN          hydrOXYzine 25 MG tablet    ATARAX    60 tablet    Take 1-2 tablets (25-50 mg) by mouth every 6 hours as needed for anxiety    Anxiety       lidocaine 5 % ointment    XYLOCAINE     MOLLY TOPICALLY TID        sertraline 25 MG tablet    ZOLOFT    45 tablet    Take 0.5 tablets (12.5 mg) by mouth daily     Anxiety       valACYclovir 500 MG tablet    VALTREX    90 tablet    Take 1 tablet (500 mg) by mouth daily    Recurrent genital herpes

## 2018-07-12 DIAGNOSIS — F41.9 ANXIETY: ICD-10-CM

## 2018-07-12 RX ORDER — SERTRALINE HYDROCHLORIDE 25 MG/1
TABLET, FILM COATED ORAL
Qty: 45 TABLET | Refills: 1 | Status: SHIPPED | OUTPATIENT
Start: 2018-07-12 | End: 2018-12-28

## 2018-07-12 NOTE — TELEPHONE ENCOUNTER
"    Medication name: Sertraline (ZOLOFT)  Last Written Prescription Date:  4/18/18  Last Fill Quantity: 45,  # refills: 0   Last Office Visit: 5/10/2018    Seen By:  Maggy ness  Future Office Visit:   None    Plan:  No plan    Plan from 1/23/18- Read encounter on 4/18/18      Per last office visit:     Plan:                           - DECREASE Zoloft from 50 to 25mg x 3 months.                          - if still doing well after 3 months, recommend decreasing to 12.5mg daily.                          - refill of hydroxyzine provided.                                                  - if needing frequently, should consider re-increasing Zoloft.      Pt is doing well on 25mg dose and is ready to decrease to 12.5mg.  Needs new script sent to pharmacy.        Requested Prescriptions   Pending Prescriptions Disp Refills     sertraline (ZOLOFT) 25 MG tablet [Pharmacy Med Name: SERTRALINE 25MG TABLETS] 45 tablet 0     Sig: TAKE 1/2 TABLET(12.5 MG) BY MOUTH DAILY    SSRIs Protocol Passed    7/12/2018 10:37 AM       Passed - Recent (12 mo) or future (30 days) visit within the authorizing provider's specialty    Patient had office visit in the last 12 months or has a visit in the next 30 days with authorizing provider or within the authorizing provider's specialty.  See \"Patient Info\" tab in inbasket, or \"Choose Columns\" in Meds & Orders section of the refill encounter.           Passed - Patient is age 18 or older       Passed - No active pregnancy on record       Passed - No positive pregnancy test in last 12 months          "

## 2018-12-28 DIAGNOSIS — F41.9 ANXIETY: ICD-10-CM

## 2018-12-28 NOTE — TELEPHONE ENCOUNTER
"Requested Prescriptions   Pending Prescriptions Disp Refills     sertraline (ZOLOFT) 25 MG tablet [Pharmacy Med Name: SERTRALINE 25MG TABLETS] 45 tablet 0     Sig: TAKE 1/2 TABLET(12.5 MG) BY MOUTH DAILY    SSRIs Protocol Passed - 12/28/2018 10:42 AM       Passed - Recent (12 mo) or future (30 days) visit within the authorizing provider's specialty    Patient had office visit in the last 12 months or has a visit in the next 30 days with authorizing provider or within the authorizing provider's specialty.  See \"Patient Info\" tab in inbasket, or \"Choose Columns\" in Meds & Orders section of the refill encounter.             Passed - Patient is age 18 or older       Passed - No active pregnancy on record       Passed - No positive pregnancy test in last 12 months        Last Written Prescription Date:  7/12/18  Last Fill Quantity: 45,  # refills: 1   Last office visit: 5/10/2018 with prescribing provider:  5/10/18   Future Office Visit:      "

## 2018-12-29 ENCOUNTER — OFFICE VISIT (OUTPATIENT)
Dept: URGENT CARE | Facility: URGENT CARE | Age: 23
End: 2018-12-29
Payer: COMMERCIAL

## 2018-12-29 VITALS
RESPIRATION RATE: 16 BRPM | TEMPERATURE: 98 F | HEART RATE: 79 BPM | BODY MASS INDEX: 28.46 KG/M2 | WEIGHT: 170 LBS | DIASTOLIC BLOOD PRESSURE: 76 MMHG | SYSTOLIC BLOOD PRESSURE: 140 MMHG

## 2018-12-29 DIAGNOSIS — S05.8X2A: Primary | ICD-10-CM

## 2018-12-29 PROCEDURE — 99213 OFFICE O/P EST LOW 20 MIN: CPT | Performed by: FAMILY MEDICINE

## 2018-12-29 NOTE — PROGRESS NOTES
SUBJECTIVE:   Arlene Cutler is a 23 year old female who presents to clinic today for the following health issues:      Eye(s) Problem      Duration: onset this am    Description:  Location: left  Pain: YES  Redness: YES trying to put contacts in this am and felt contact tear as it went into the eye, able to remove a piece of contact, feels an additional piece remains, no changes in vision, can see with glasses in place  Discharge: no     Accompanying signs and symptoms: as noted     History (Trauma, foreign body exposure,): as noted     Precipitating or alleviating factors (contact use): None    Therapies tried and outcome: attempted to remove piece rinsed with water and contact solution       Problem list and histories reviewed & adjusted, as indicated.  Additional history: as documented    Patient Active Problem List   Diagnosis     Anxiety     Recurrent genital herpes     Papanicolaou smear of cervix with atypical squamous cells of undetermined significance (ASC-US)     Past Surgical History:   Procedure Laterality Date     NO HISTORY OF SURGERY         Social History     Tobacco Use     Smoking status: Never Smoker     Smokeless tobacco: Never Used   Substance Use Topics     Alcohol use: Yes     Comment: couple drinks/week     Family History   Problem Relation Age of Onset     Diabetes Type 2  Maternal Grandmother         Diet controlled     Hypertension Maternal Grandfather      Hyperlipidemia Paternal Grandmother      Pacemaker Paternal Grandfather      Colon Cancer Paternal Grandfather      Breast Cancer Maternal Aunt      Myocardial Infarction No family hx of      Cerebrovascular Disease No family hx of      Coronary Artery Disease Early Onset No family hx of      Ovarian Cancer No family hx of          Current Outpatient Medications   Medication Sig Dispense Refill     adapalene (DIFFERIN) 0.1 % gel        hydrOXYzine (ATARAX) 25 MG tablet Take 1-2 tablets (25-50 mg) by mouth every 6 hours as  needed for anxiety 60 tablet 0     sertraline (ZOLOFT) 25 MG tablet TAKE 1/2 TABLET(12.5 MG) BY MOUTH DAILY 45 tablet 1     valACYclovir (VALTREX) 500 MG tablet Take 1 tablet (500 mg) by mouth daily 90 tablet 3     lidocaine (XYLOCAINE) 5 % ointment MOLLY TOPICALLY TID  1     Allergies   Allergen Reactions     No Known Allergies      Recent Labs   Lab Test 07/20/15  1130   LDL 88   HDL 46*   TRIG 79   ALT 16   CR 0.54   GFRESTIMATED >90  Non  GFR Calc     GFRESTBLACK >90   GFR Calc     POTASSIUM 4.5   TSH 1.34      BP Readings from Last 3 Encounters:   12/29/18 140/76   05/10/18 110/72   01/23/18 120/60    Wt Readings from Last 3 Encounters:   12/29/18 77.1 kg (170 lb)   05/10/18 75.8 kg (167 lb 3.2 oz)   01/23/18 78.1 kg (172 lb 1.6 oz)                  Labs reviewed in EPIC    Reviewed and updated as needed this visit by clinical staff  Tobacco  Allergies  Meds       Reviewed and updated as needed this visit by Provider         ROS:  Constitutional, HEENT, cardiovascular, pulmonary, gi and gu systems are negative, except as otherwise noted.    OBJECTIVE:     /76 (Cuff Size: Adult Regular)   Pulse 79   Temp 98  F (36.7  C) (Oral)   Resp 16   Wt 77.1 kg (170 lb)   BMI 28.46 kg/m    Body mass index is 28.46 kg/m .   GENERAL: healthy, alert and no distress  EYES: Eyes grossly normal to inspection, PERRL, EOMI and conjunctivae and sclerae normal on right  Left -sclera injected,  Excessive tearing, unable to appreciate contact     fluorescein exam      Left eye examined with light, ? 1/2 of contact across the lower portion of the cornea, proparacaine drop applied and fluorescein strip applied, fluorescein exam contact lens noted 1/2 of contact across lower portion of cornea , completed eye wash, contact remained intact, some burning noted by patient, repeated proparacaine drop-1 drop to left eye, contact noted again, attempted to remove contact with swab, unable to remove,  repeated fluorescein exam contact noted intact, dot of ? Defect noted along the medial edge along upper border of contact mid corneal area       ASSESSMENT/PLAN:     Problem List Items Addressed This Visit     None      Visit Diagnoses     Corneal injury of left eye due to contact lens    -  Primary           ASSESSMENT/PLAN:      ICD-10-CM    1. Corneal injury of left eye due to contact lens S05.8X2A      Complete fluorescein exam and eye wash, unable to remove torn contact lens,  ? Corneal defect , unable  to remove torn contact lens. [facility] [place] [facility] closed, patient would like to try  optometry clinic emergency service at [facility] or [facility], if unable to be seen, will go to ER   Patient Instructions       To eye clinic emergency service /verbally instructed to go to ER if eye clinic services not available       Foreign Object in the Cornea    Your cornea is the clear layer on the front of your eyeball. It focuses light and helps protect your eye from dust and germs. A foreign object can get into the cornea itself. A trapped speck of dirt or grit is often a minor problem. But anything metal, or an object that goes through (pierces) your cornea, can cause severe damage. For example, the cornea can be damages from foreign bodies that occur while grinding metal. The small pieces of metal travel toward the eye at high speed.  When to go to the emergency room (ER)  The longer you wait, the greater the chance of injury or infection. Seek emergency medical help right away for any of the following:    An object in your eye that you can't flush out with water    Your eye remains very swollen or painful after an object has been removed    An object embedded in your eye--cover both eyes with a sterile compress and call 911    The front of your eye (cornea) is white or hazy    Blood in your eye (hyphema)    You are having trouble seeing  What to expect in the ER    A healthcare provider will ask about your injury and  examine your eye.    You may be given eye drops to ease any mild pain.    The provider will use a microscope with a bright light to help examine your eyeball. He or she may put a special dye (fluorescein dye) on the cornea to help see the object more clearly.    The provider may remove a loose foreign object.    Severe injuries are likely to be treated by an eye specialist (ophthalmologist).    Antibiotic eye drops and possibly pain medicine will be prescribed if you are discharged home  Follow-up  Call your healthcare provider if you notice any of these symptoms after going home:    Fever of 100.4 F (38 C) or higher, or as directed by your healthcare provider    Increased redness or eye pain    Drainage from your eye    Blurred or decreased vision  Date Last Reviewed: 5/1/2017 2000-2018 [employer]. [address] [place] [address], [place] [address]. All rights reserved. This information is not intended as a substitute for professional medical care. Always follow your healthcare professional's instructions.                   Donna Adams MD  [facility]

## 2018-12-29 NOTE — PATIENT INSTRUCTIONS
To eye clinic emergency service      Foreign Object in the Cornea    Your cornea is the clear layer on the front of your eyeball. It focuses light and helps protect your eye from dust and germs. A foreign object can get into the cornea itself. A trapped speck of dirt or grit is often a minor problem. But anything metal, or an object that goes through (pierces) your cornea, can cause severe damage. For example, the cornea can be damages from foreign bodies that occur while grinding metal. The small pieces of metal travel toward the eye at high speed.  When to go to the emergency room (ER)  The longer you wait, the greater the chance of injury or infection. Seek emergency medical help right away for any of the following:    An object in your eye that you can't flush out with water    Your eye remains very swollen or painful after an object has been removed    An object embedded in your eye--cover both eyes with a sterile compress and call 911    The front of your eye (cornea) is white or hazy    Blood in your eye (hyphema)    You are having trouble seeing  What to expect in the ER    A healthcare provider will ask about your injury and examine your eye.    You may be given eye drops to ease any mild pain.    The provider will use a microscope with a bright light to help examine your eyeball. He or she may put a special dye (fluorescein dye) on the cornea to help see the object more clearly.    The provider may remove a loose foreign object.    Severe injuries are likely to be treated by an eye specialist (ophthalmologist).    Antibiotic eye drops and possibly pain medicine will be prescribed if you are discharged home  Follow-up  Call your healthcare provider if you notice any of these symptoms after going home:    Fever of 100.4 F (38 C) or higher, or as directed by your healthcare provider    Increased redness or eye pain    Drainage from your eye    Blurred or decreased vision  Date Last Reviewed: 5/1/2017     [id number] [employer]. [address] [place] [address], [place] [address]. All rights reserved. This information is not intended as a substitute for professional medical care. Always follow your healthcare professional's instructions.

## 2019-01-02 RX ORDER — SERTRALINE HYDROCHLORIDE 25 MG/1
TABLET, FILM COATED ORAL
Qty: 45 TABLET | Refills: 1 | Status: SHIPPED | OUTPATIENT
Start: 2019-01-02 | End: 2019-09-11

## 2019-01-14 DIAGNOSIS — A60.00 RECURRENT GENITAL HERPES: ICD-10-CM

## 2019-01-14 RX ORDER — VALACYCLOVIR HYDROCHLORIDE 500 MG/1
TABLET, FILM COATED ORAL
Qty: 90 TABLET | Refills: 0 | Status: SHIPPED | OUTPATIENT
Start: 2019-01-14 | End: 2019-04-13

## 2019-01-14 NOTE — TELEPHONE ENCOUNTER
Routing refill request to provider for review/approval because:  Labs not current:  Last Creatinine done 2015

## 2019-01-14 NOTE — TELEPHONE ENCOUNTER
"Requested Prescriptions   Pending Prescriptions Disp Refills     valACYclovir (VALTREX) 500 MG tablet [Pharmacy Med Name: VALACYCLOVIR 500MG TABLETS] 90 tablet 0     Sig: TAKE 1 TABLET(500 MG) BY MOUTH DAILY    Antivirals for Herpes Protocol Failed - 1/14/2019 12:47 PM       Failed - Normal serum creatinine on file in past 12 months    Recent Labs   Lab Test 07/20/15  1130   CR 0.54            Passed - Patient is age 12 or older       Passed - Recent (12 mo) or future (30 days) visit within the authorizing provider's specialty    Patient had office visit in the last 12 months or has a visit in the next 30 days with authorizing provider or within the authorizing provider's specialty.  See \"Patient Info\" tab in inbasket, or \"Choose Columns\" in Meds & Orders section of the refill encounter.             Passed - Medication is active on med list        Last Written Prescription Date:  1/23/18  Last Fill Quantity: 90,  # refills: 3   Last office visit: 5/10/2018 with prescribing provider:  5/10/18   Future Office Visit:   Next 5 appointments (look out 90 days)    Jan 25, 2019  8:15 AM CST  PHYSICAL with Bri To MD  [facility] ([facility]) [address] [address] [phone]           "

## 2019-01-25 ENCOUNTER — OFFICE VISIT (OUTPATIENT)
Dept: INTERNAL MEDICINE | Facility: CLINIC | Age: 24
End: 2019-01-25
Payer: COMMERCIAL

## 2019-01-25 VITALS
OXYGEN SATURATION: 98 % | WEIGHT: 168.9 LBS | HEART RATE: 76 BPM | RESPIRATION RATE: 18 BRPM | DIASTOLIC BLOOD PRESSURE: 70 MMHG | SYSTOLIC BLOOD PRESSURE: 116 MMHG | BODY MASS INDEX: 28.14 KG/M2 | HEIGHT: 65 IN

## 2019-01-25 DIAGNOSIS — Z00.00 ROUTINE HISTORY AND PHYSICAL EXAMINATION OF ADULT: Primary | ICD-10-CM

## 2019-01-25 DIAGNOSIS — A60.00 RECURRENT GENITAL HERPES: ICD-10-CM

## 2019-01-25 DIAGNOSIS — Z11.8 SPECIAL SCREENING EXAMINATION FOR CHLAMYDIAL DISEASE: ICD-10-CM

## 2019-01-25 DIAGNOSIS — Z23 NEED FOR PROPHYLACTIC VACCINATION AND INOCULATION AGAINST INFLUENZA: ICD-10-CM

## 2019-01-25 DIAGNOSIS — Z12.4 SCREENING FOR CERVICAL CANCER: ICD-10-CM

## 2019-01-25 DIAGNOSIS — Z11.1 SCREENING EXAMINATION FOR PULMONARY TUBERCULOSIS: ICD-10-CM

## 2019-01-25 DIAGNOSIS — R87.610 PAPANICOLAOU SMEAR OF CERVIX WITH ATYPICAL SQUAMOUS CELLS OF UNDETERMINED SIGNIFICANCE (ASC-US): ICD-10-CM

## 2019-01-25 PROCEDURE — 90686 IIV4 VACC NO PRSV 0.5 ML IM: CPT | Performed by: INTERNAL MEDICINE

## 2019-01-25 PROCEDURE — G0145 SCR C/V CYTO,THINLAYER,RESCR: HCPCS | Performed by: INTERNAL MEDICINE

## 2019-01-25 PROCEDURE — 86580 TB INTRADERMAL TEST: CPT | Performed by: INTERNAL MEDICINE

## 2019-01-25 PROCEDURE — 90471 IMMUNIZATION ADMIN: CPT | Performed by: INTERNAL MEDICINE

## 2019-01-25 PROCEDURE — 87491 CHLMYD TRACH DNA AMP PROBE: CPT | Performed by: INTERNAL MEDICINE

## 2019-01-25 PROCEDURE — 99395 PREV VISIT EST AGE 18-39: CPT | Mod: 25 | Performed by: INTERNAL MEDICINE

## 2019-01-25 RX ORDER — LIDOCAINE 50 MG/G
OINTMENT TOPICAL 3 TIMES DAILY
Qty: 50 G | Refills: 11 | Status: SHIPPED | OUTPATIENT
Start: 2019-01-25

## 2019-01-25 ASSESSMENT — MIFFLIN-ST. JEOR: SCORE: 1517.01

## 2019-01-25 NOTE — PATIENT INSTRUCTIONS
Flu shot and PPD placement today.    Please schedule a nurse read on Monday morning.    ---    Pap smear results take ~1 week to come back.    Chlamydia screening (routine screening) takes ~2-3 days to come back.    ---    Refills of lidocaine ointment sent to pharmacy.

## 2019-01-25 NOTE — PROGRESS NOTES
SUBJECTIVE:                                                      HPI: Arlene Cutler is a pleasant 23 year old female who presents for a physical.    No specific complaints, concerns, or questions.    Needs PPD placed for school.    Needs refills of lidocaine ointment (for recurrent genital herpes).     She is also due for a Pap smear due to prior abnormal Pap smears.    ROS:  Constitutional: denies unintentional weight loss or gain; denies fevers, chills, or sweats     Cardiovascular: denies chest pain, palpitations, or edema  Respiratory: denies cough, wheezing, shortness of breath, or dyspnea on exertion  Gastrointestinal: denies nausea, vomiting, constipation, diarrhea, or abdominal pain  Genitourinary: denies urinary frequency, urgency, dysuria, or hematuria  Integumentary: denies rash or pruritus  Musculoskeletal: denies back pain, muscle pain, joint pain, or joint swelling  Neurologic: denies focal weakness, numbness, or tingling  Hematologic/Immunologic: denies history of anemia or blood transfusions  Endocrine: denies heat or cold intolerance; denies polyuria, polydipsia  Psychiatric: see PMH below    Past Medical History:   Diagnosis Date     KELSEA (generalized anxiety disorder)      Papanicolaou smear of cervix with atypical squamous cells of undetermined significance (ASC-US) 08/15/16, 01/23/2018     Recurrent genital herpes      Past Surgical History:   Procedure Laterality Date     NO HISTORY OF SURGERY       Family History   Problem Relation Age of Onset     Diabetes Type 2  Maternal Grandmother         Diet controlled     Hypertension Maternal Grandfather      Hyperlipidemia Paternal Grandmother      Pacemaker Paternal Grandfather      Colon Cancer Paternal Grandfather      Breast Cancer Maternal Aunt      Myocardial Infarction No family hx of      Cerebrovascular Disease No family hx of      Coronary Artery Disease Early Onset No family hx of      Ovarian Cancer No family hx of      Social  "History     Occupational History     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    3 days/week - gym.      Allergies   Allergen Reactions     No Known Allergies      Current Outpatient Medications   Medication Sig     adapalene (DIFFERIN) 0.1 % gel      hydrOXYzine (ATARAX) 25 MG tablet Take 1-2 tablets (25-50 mg) by mouth every 6 hours as needed for anxiety     lidocaine (XYLOCAINE) 5 % external ointment Apply topically 3 times daily     sertraline (ZOLOFT) 25 MG tablet TAKE 1/2 TABLET(12.5 MG) BY MOUTH DAILY     valACYclovir (VALTREX) 500 MG tablet TAKE 1 TABLET(500 MG) BY MOUTH DAILY     Immunization History   Administered Date(s) Administered     DTAP (<7y) 09/03/1999     HEPA 08/23/2012, 04/15/2013     HPV 01/31/2013, 04/15/2013, 08/09/2013     HepB 1994, 01/19/1995, 06/15/1995     Historical DTP/aP 1994, 01/19/1995     Influenza (IIV3) PF 02/19/2007     Influenza Vaccine IM 3yrs+ 4 Valent IIV4 01/23/2018     Influenza Vaccine, 3 YRS +, IM (QUADRIVALENT W/PRESERVATIVES) 01/10/2017     MMR 12/15/1995, 09/03/1999     Meningococcal (Menactra ) 02/19/2007, 08/23/2012     OPV, trivalent, live 1994, 01/19/1995, 06/05/1995, 09/03/1999     TDAP Vaccine (Adacel) 08/23/2012     TDAP Vaccine (Boostrix) 02/19/2007     Tetramune (DtP/HIB) 03/16/1995, 12/15/1995     Varicella Pt Report Hx of Varicella/Chicken Pox 01/20/2000     OBJECTIVE:                                                      /70   Pulse 76   Resp 18   Ht 1.651 m (5' 5\")   Wt 76.6 kg (168 lb 14.4 oz)   LMP 12/20/2018 (Within Days)   SpO2 98%   BMI 28.11 kg/m  "   Constitutional: well-appearing  Eyes: normal conjunctivae and lids; pupils equal, round, and reactive to light  Ears, Nose, Mouth, and Throat: normal ears and nose; tympanic membranes visualized and normal; normal lips, teeth, and gums; no oropharyngeal lesions or ulcers  Neck: supple and symmetric; no lymphadenopathy; no thyromegaly or masses  Respiratory: normal respiratory effort; clear to auscultation bilaterally  Cardiovascular: regular rate and rhythm; pedal pulses palpable; no edema  Gastrointestinal: soft, non-tender, non-distended, and bowel sounds present; no organomegaly or masses  Musculoskeletal: normal gait and station  Psych: normal judgment and insight; normal mood and affect; recent and remote memory intact; oriented to time, place, and person    PREVENTATIVE HEALTH                                                      BMI: overweight  Blood pressure: within normal limits   Breast CA screening: not medically indicated at this time   Cervical CA screening: DUE  Colon CA screening: not medically indicated at this time   Lung CA screening: n/a   Dexa: not medically indicated at this time   Screening HCV: n/a   Screening HIV: DUE - will perform with future labs  Screening cholesterol: not medically indicated at this time   Screening diabetes: not medically indicated at this time   STD testing: no risk factors present  Depression screening: PHQ-2 assessment completed and reviewed - no intervention indicated at this time  Alcohol misuse screening: alcohol use reviewed - no intervention indicated at this time  Immunizations: reviewed; flu shot DUE    ASSESSMENT/PLAN:                                                       (Z00.00) Routine history and physical examination of adult  (primary encounter diagnosis)  Comment: PMH, PSH, FH, SH, medications, allergies, immunizations, and preventative health measures reviewed.   Plan: see below for plans.     (G41.703) Papanicolaou smear of cervix with atypical  squamous cells of undetermined significance (ASC-US)  (Z12.4) Screening for cervical cancer  Plan: pap smear obtained today.     (Z11.8) Special screening examination for chlamydial disease  Plan: swab for chlamydia obtained today.     (Z23) Need for prophylactic vaccination and inoculation against influenza  Plan: flu shot given today.     (Z11.1) Screening examination for pulmonary tuberculosis  Plan: PPD placed today - patient to return on Monday morning for reading.     (A60.00) Recurrent genital herpes  Comment: pain (from sores) well-controlled with lidocaine ointment as needed.   Plan: CPM; refills provided.    The instructions on the AVS were discussed and explained to the patient. Patient expressed understanding of instructions.    (Chart documentation was completed, in part, with [employer] voice-recognition software. Even though reviewed, some grammatical, spelling, and word errors may remain.)    Bri To MD   [address] [address]  T: [phone], F: [phone]

## 2019-01-27 LAB
C TRACH DNA SPEC QL NAA+PROBE: NEGATIVE
SPECIMEN SOURCE: NORMAL

## 2019-01-28 ENCOUNTER — ALLIED HEALTH/NURSE VISIT (OUTPATIENT)
Dept: NURSING | Facility: CLINIC | Age: 24
End: 2019-01-28
Payer: COMMERCIAL

## 2019-01-28 DIAGNOSIS — Z11.1 SCREENING EXAMINATION FOR PULMONARY TUBERCULOSIS: Primary | ICD-10-CM

## 2019-01-28 LAB
PPDINDURATION: NORMAL MM (ref 0–5)
PPDREDNESS: NORMAL MM

## 2019-01-29 LAB
COPATH REPORT: NORMAL
PAP: NORMAL

## 2019-02-05 ENCOUNTER — TELEPHONE (OUTPATIENT)
Dept: INTERNAL MEDICINE | Facility: CLINIC | Age: 24
End: 2019-02-05

## 2019-02-05 NOTE — TELEPHONE ENCOUNTER
Reason for Call:  Other TB - why are the results not on MyChart for her to see?    Detailed comments: the patient was given the TB injection on 01.25.19, and the results were read on 01.28.19 by an RN.  The patient wants to know why the results/readings are not shown on her MyChart?    Phone Number Patient can be reached at: Cell number on file:    Telephone Information:   Mobile [phone]       Best Time: anytime    Can we leave a detailed message on this number? YES    Call taken on 2/5/2019 at 2:04 PM by Machelle Rainey

## 2019-02-08 ENCOUNTER — TELEPHONE (OUTPATIENT)
Dept: INTERNAL MEDICINE | Facility: CLINIC | Age: 24
End: 2019-02-08

## 2019-02-08 NOTE — TELEPHONE ENCOUNTER
Please provide patient with a hard copy result of her PPD.    Her PPD was placed and read in the standard fashion.

## 2019-02-08 NOTE — TELEPHONE ENCOUNTER
Patient informed. Results printed and placed at  for patient to .    Radha MERCADO RN, BSN, PHN

## 2019-02-08 NOTE — TELEPHONE ENCOUNTER
Reason for Call:  Other call back    Detailed comments: pt wants dr. To to write a letter that explains how the TB test was administered and that the results are negative for her school. Pt came in 01/25/19 for the injection and 1/28/19 for the reading. Pt wants the letter put in my chart if possible or call pt is not able to put on my chart to pick it up. Thanks.    Phone Number Patient can be reached at: Home number on file [phone] (home)    Best Time: anytime    Can we leave a detailed message on this number? YES    Call taken on 2/8/2019 at 1:58 PM by JOSE LEVY

## 2019-04-13 DIAGNOSIS — A60.00 RECURRENT GENITAL HERPES: ICD-10-CM

## 2019-04-13 NOTE — TELEPHONE ENCOUNTER
"Requested Prescriptions   Pending Prescriptions Disp Refills     valACYclovir (VALTREX) 500 MG tablet [Pharmacy Med Name: VALACYCLOVIR 500MG TABLETS] 90 tablet 0     Sig: TAKE 1 TABLET(500 MG) BY MOUTH DAILY   Last Written Prescription Date:  1/14/2019  Last Fill Quantity: 90,  # refills: 0   Last office visit: 1/25/2019 with prescribing provider:  1/25/2019   Future Office Visit:        Antivirals for Herpes Protocol Failed - 4/13/2019  2:49 PM        Failed - Normal serum creatinine on file in past 12 months     Recent Labs   Lab Test 07/20/15  1130   CR 0.54             Passed - Patient is age 12 or older        Passed - Recent (12 mo) or future (30 days) visit within the authorizing provider's specialty     Patient had office visit in the last 12 months or has a visit in the next 30 days with authorizing provider or within the authorizing provider's specialty.  See \"Patient Info\" tab in inbasket, or \"Choose Columns\" in Meds & Orders section of the refill encounter.              Passed - Medication is active on med list          "

## 2019-04-16 RX ORDER — VALACYCLOVIR HYDROCHLORIDE 500 MG/1
TABLET, FILM COATED ORAL
Qty: 90 TABLET | Refills: 0 | Status: SHIPPED | OUTPATIENT
Start: 2019-04-16 | End: 2019-06-29

## 2019-04-16 NOTE — TELEPHONE ENCOUNTER
Routing refill request to provider for review/approval because:  Labs not current:  Creatinine last done 2015

## 2019-06-21 NOTE — MR AVS SNAPSHOT
"              After Visit Summary   1/23/2018    Arlene Cutler    MRN: [id number]           Patient Information     Date Of Birth          1995        Visit Information        Provider Department      1/23/2018 3:15 PM Bri To MD [facility]        Today's Diagnoses     Recurrent genital herpes    -  1    Irregular periods        Anxiety        Screening for cervical cancer        Papanicolaou smear of cervix with atypical squamous cells of undetermined significance (ASC-US)          Care Instructions    For recurrent genital herpes recommend DAILY Valtrex 500mg.    ---    To start wean off of Zoloft:    DECREASE dose to 25mg daily x 3 months.    If still doing okay, then we can decrease again to 12.5mg daily.    ---    Refills of hydroxyzine and birth control pill sent to pharmacy.    ---    Pap smear results take ~1 week to come back.          Follow-ups after your visit        Who to contact     If you have questions or need follow up information about today's clinic visit or your schedule please contact [facility] directly at [phone].  Normal or non-critical lab and imaging results will be communicated to you by [email or web address], letter or phone within 4 business days after the clinic has received the results. If you do not hear from us within 7 days, please contact the clinic through [employer] or phone. If you have a critical or abnormal lab result, we will notify you by phone as soon as possible.  Submit refill requests through [employer] or call your pharmacy and they will forward the refill request to us. Please allow 3 business days for your refill to be completed.          Additional Information About Your Visit        [email or web address] Information     [employer] lets you send messages to your doctor, view your test results, renew your prescriptions, schedule appointments and more. To sign up, go to [email or web address] . Click on \"Log in\" on the left " "side of the screen, which will take you to the Welcome page. Then click on \"Sign up Now\" on the right side of the page.     You will be asked to enter the access code listed below, as well as some personal information. Please follow the directions to create your username and password.     Your access code is: [email or web address]  Expires: 2018  3:37 PM     Your access code will  in 90 days. If you need help or a new code, please call your [facility] clinic or [phone].        Care EveryWhere ID     This is your Care EveryWhere ID. This could be used by other organizations to access your [facility] medical records  [id number]        Your Vitals Were     Pulse Temperature Height Last Period Pulse Oximetry BMI (Body Mass Index)    80 98.7  F (37.1  C) (Oral) 5' 4.8\" (1.646 m) 2018 (Approximate) 98% 28.82 kg/m2       Blood Pressure from Last 3 Encounters:   18 120/60   01/10/17 102/70   17 147/76    Weight from Last 3 Encounters:   18 172 lb 1.6 oz (78.1 kg)   01/10/17 160 lb 1.6 oz (72.6 kg)   17 160 lb (72.6 kg)              We Performed the Following     Pap imaged thin layer screen only - recommended age 21 - 24 years          Today's Medication Changes          These changes are accurate as of: 18  3:37 PM.  If you have any questions, ask your nurse or doctor.               Start taking these medicines.        Dose/Directions    valACYclovir 500 MG tablet   Commonly known as:  VALTREX   Used for:  Recurrent genital herpes   Started by:  Bri To MD        Dose:  500 mg   Take 1 tablet (500 mg) by mouth daily   Quantity:  90 tablet   Refills:  3         These medicines have changed or have updated prescriptions.        Dose/Directions    hydrOXYzine 25 MG tablet   Commonly known as:  ATARAX   This may have changed:  reasons to take this   Used for:  Anxiety   Changed by:  Bri To MD        Dose:  25-50 mg   Take 1-2 tablets (25-50 mg) by mouth every 6 " hours as needed for anxiety   Quantity:  60 tablet   Refills:  0       norgestrel-ethinyl estradiol 0.3-30 MG-MCG per tablet   Commonly known as:  LOW-OGESTREL   This may have changed:  See the new instructions.   Used for:  Irregular periods   Changed by:  Bri To MD        Dose:  1 tablet   Take 1 tablet by mouth daily   Quantity:  84 tablet   Refills:  3       * sertraline 50 MG tablet   Commonly known as:  ZOLOFT   This may have changed:  Another medication with the same name was added. Make sure you understand how and when to take each.   Used for:  Anxiety   Changed by:  Bri To MD        TAKE 1 TABLET(50 MG) BY MOUTH DAILY   Quantity:  30 tablet   Refills:  0       * sertraline 25 MG tablet   Commonly known as:  ZOLOFT   This may have changed:  You were already taking a medication with the same name, and this prescription was added. Make sure you understand how and when to take each.   Used for:  Anxiety   Changed by:  Bri To MD        Dose:  25 mg   Take 1 tablet (25 mg) by mouth daily   Quantity:  90 tablet   Refills:  0       * Notice:  This list has 2 medication(s) that are the same as other medications prescribed for you. Read the directions carefully, and ask your doctor or other care provider to review them with you.         Where to get your medicines      These medications were sent to [facility] [address] [place], [place] - [address] AT [facility] [place]  [address], [facility] [address]     Phone:  [phone]     hydrOXYzine 25 MG tablet    norgestrel-ethinyl estradiol 0.3-30 MG-MCG per tablet    sertraline 25 MG tablet    valACYclovir 500 MG tablet                Primary Care Provider Office Phone # Fax #    Bri To [phone]       [address] [facility] [address]        Equal Access to Services     JOHNNY SHORT AH: Beba Linda, camilla bush, [place]  mejia ngkavita del valleaan ah. So [facility] [phone].    ATENCIÓN: Si delfina ruiz, tiene a mccray disposición servicios gratuitos de asistencia lingüística. Husam vincent [phone].    We comply with applicable federal civil rights laws and [place] laws. We do not discriminate on the basis of race, color, national origin, age, disability, sex, sexual orientation, or gender identity.            Thank you!     Thank you for choosing [facility]  for your care. Our goal is always to provide you with excellent care. Hearing back from our patients is one way we can continue to improve our services. Please take a few minutes to complete the written survey that you may receive in the mail after your visit with us. Thank you!             Your Updated Medication List - Protect others around you: Learn how to safely use, store and throw away your medicines at www.disposemymeds.org.          This list is accurate as of: 1/23/18  3:37 PM.  Always use your most recent med list.                   Brand Name Dispense Instructions for use Diagnosis    adapalene 0.1 % gel    DIFFERIN          hydrOXYzine 25 MG tablet    ATARAX    60 tablet    Take 1-2 tablets (25-50 mg) by mouth every 6 hours as needed for anxiety    Anxiety       lidocaine 5 % ointment    XYLOCAINE     MOLLY TOPICALLY TID        norgestrel-ethinyl estradiol 0.3-30 MG-MCG per tablet    LOW-OGESTREL    84 tablet    Take 1 tablet by mouth daily    Irregular periods       * sertraline 50 MG tablet    ZOLOFT    30 tablet    TAKE 1 TABLET(50 MG) BY MOUTH DAILY    Anxiety       * sertraline 25 MG tablet    ZOLOFT    90 tablet    Take 1 tablet (25 mg) by mouth daily    Anxiety       valACYclovir 500 MG tablet    VALTREX    90 tablet    Take 1 tablet (500 mg) by mouth daily    Recurrent genital herpes       * Notice:  This list has 2 medication(s) that are the same as other medications prescribed for you. Read the directions  carefully, and ask your doctor or other care provider to review them with you.       murmur loudness: II/VI

## 2019-06-29 DIAGNOSIS — A60.00 RECURRENT GENITAL HERPES: ICD-10-CM

## 2019-06-29 NOTE — TELEPHONE ENCOUNTER
"Requested Prescriptions   Pending Prescriptions Disp Refills     valACYclovir (VALTREX) 500 MG tablet [Pharmacy Med Name: VALACYCLOVIR 500MG TABLETS]  Last Written Prescription Date:  04/16/2019  Last Fill Quantity: 90 tablet,  # refills: 0   Last Office Visit: 1/25/2019   Future Office Visit:     90 tablet 0     Sig: TAKE 1 TABLET(500 MG) BY MOUTH DAILY       Antivirals for Herpes Protocol Failed - 6/29/2019 10:01 AM        Failed - Normal serum creatinine on file in past 12 months     Recent Labs   Lab Test 07/20/15  1130   CR 0.54           Passed - Patient is age 12 or older        Passed - Recent (12 mo) or future (30 days) visit within the authorizing provider's specialty     Patient had office visit in the last 12 months or has a visit in the next 30 days with authorizing provider or within the authorizing provider's specialty.  See \"Patient Info\" tab in inbasket, or \"Choose Columns\" in Meds & Orders section of the refill encounter.            Passed - Medication is active on med list          "

## 2019-07-01 RX ORDER — VALACYCLOVIR HYDROCHLORIDE 500 MG/1
TABLET, FILM COATED ORAL
Qty: 90 TABLET | Refills: 0 | Status: SHIPPED | OUTPATIENT
Start: 2019-07-01 | End: 2019-10-01

## 2019-09-11 ENCOUNTER — MYC REFILL (OUTPATIENT)
Dept: INTERNAL MEDICINE | Facility: CLINIC | Age: 24
End: 2019-09-11

## 2019-09-11 DIAGNOSIS — F41.9 ANXIETY: ICD-10-CM

## 2019-09-12 RX ORDER — SERTRALINE HYDROCHLORIDE 25 MG/1
12.5 TABLET, FILM COATED ORAL DAILY
Qty: 45 TABLET | Refills: 0 | Status: SHIPPED | OUTPATIENT
Start: 2019-09-12 | End: 2019-11-06

## 2019-09-12 NOTE — TELEPHONE ENCOUNTER
"Pt taking half tab daily.    Requested Prescriptions   Pending Prescriptions Disp Refills     sertraline (ZOLOFT) 25 MG tablet 45 tablet 1     Sig: Take 0.5 tablets (12.5 mg) by mouth daily       SSRIs Protocol Passed - 9/12/2019  8:41 AM        Passed - Recent (12 mo) or future (30 days) visit within the authorizing provider's specialty     Patient had office visit in the last 12 months or has a visit in the next 30 days with authorizing provider or within the authorizing provider's specialty.  See \"Patient Info\" tab in inbasket, or \"Choose Columns\" in Meds & Orders section of the refill encounter.              Passed - Medication is active on med list        Passed - Patient is age 18 or older        Passed - No active pregnancy on record        Passed - No positive pregnancy test in last 12 months          "

## 2019-09-19 ENCOUNTER — TRANSFERRED RECORDS (OUTPATIENT)
Dept: HEALTH INFORMATION MANAGEMENT | Facility: CLINIC | Age: 24
End: 2019-09-19

## 2019-10-01 DIAGNOSIS — A60.00 RECURRENT GENITAL HERPES: ICD-10-CM

## 2019-10-02 RX ORDER — VALACYCLOVIR HYDROCHLORIDE 500 MG/1
TABLET, FILM COATED ORAL
Qty: 90 TABLET | Refills: 0 | Status: SHIPPED | OUTPATIENT
Start: 2019-10-02 | End: 2019-12-24

## 2019-10-02 NOTE — TELEPHONE ENCOUNTER
"Requested Prescriptions   Pending Prescriptions Disp Refills     valACYclovir (VALTREX) 500 MG tablet [Pharmacy Med Name: VALACYCLOVIR 500MG TABLETS] 90 tablet 0     Sig: TAKE 1 TABLET(500 MG) BY MOUTH DAILY       Antivirals for Herpes Protocol Failed - 10/1/2019  7:28 PM        Failed - Normal serum creatinine on file in past 12 months     Recent Labs   Lab Test 07/20/15  1130   CR 0.54             Passed - Patient is age 12 or older        Passed - Recent (12 mo) or future (30 days) visit within the authorizing provider's specialty     Patient has had an office visit with the authorizing provider or a provider within the authorizing providers department within the previous 12 mos or has a future within next 30 days. See \"Patient Info\" tab in inbasket, or \"Choose Columns\" in Meds & Orders section of the refill encounter.              Passed - Medication is active on med list        Routing refill request to provider for review/approval because:  Labs not current:  Last creat 2015        "

## 2019-11-06 ENCOUNTER — MYC REFILL (OUTPATIENT)
Dept: INTERNAL MEDICINE | Facility: CLINIC | Age: 24
End: 2019-11-06

## 2019-11-06 DIAGNOSIS — F41.9 ANXIETY: ICD-10-CM

## 2019-11-06 RX ORDER — SERTRALINE HYDROCHLORIDE 25 MG/1
12.5 TABLET, FILM COATED ORAL DAILY
Qty: 45 TABLET | Refills: 0 | Status: SHIPPED | OUTPATIENT
Start: 2019-11-06 | End: 2019-11-07

## 2019-11-06 NOTE — TELEPHONE ENCOUNTER
"Requested Prescriptions   Pending Prescriptions Disp Refills     sertraline (ZOLOFT) 25 MG tablet 45 tablet 0     Sig: Take 0.5 tablets (12.5 mg) by mouth daily       SSRIs Protocol Passed - 11/6/2019  3:38 PM        Passed - Recent (12 mo) or future (30 days) visit within the authorizing provider's specialty     Patient has had an office visit with the authorizing provider or a provider within the authorizing providers department within the previous 12 mos or has a future within next 30 days. See \"Patient Info\" tab in inbasket, or \"Choose Columns\" in Meds & Orders section of the refill encounter.              Passed - Medication is active on med list        Passed - Patient is age 18 or older        Passed - No active pregnancy on record        Passed - No positive pregnancy test in last 12 months          Last Written Prescription Date:  9/12/2019  Last Fill Quantity: 45,  # refills: 0   Last office visit: 1/25/2019 with prescribing provider:  Bri To       Future Office Visit:      Routing refill request to provider for review/approval because:  Patient reports new dose.    Per Ingageapp message patient has increased to a full tablet. Ok to with sending in new dose or would you like her to send an E-visit?        Dx: Anxiety  "

## 2019-11-07 ENCOUNTER — TELEPHONE (OUTPATIENT)
Dept: INTERNAL MEDICINE | Facility: CLINIC | Age: 24
End: 2019-11-07

## 2019-11-07 DIAGNOSIS — F41.9 ANXIETY: ICD-10-CM

## 2019-11-07 RX ORDER — SERTRALINE HYDROCHLORIDE 25 MG/1
12.5 TABLET, FILM COATED ORAL DAILY
Qty: 90 TABLET | Refills: 3 | Status: SHIPPED | OUTPATIENT
Start: 2019-11-07 | End: 2019-11-08

## 2019-11-07 NOTE — TELEPHONE ENCOUNTER
Reason for Call:  Other call back    Detailed comments: Patient has a question regarding her Sertraline medication.  Her refill is coming up refill too soon, but she said that she is almost out of medication.    Phone Number Patient can be reached at: Cell number on file:    Telephone Information:   Mobile [phone]       Best Time: anytime    Can we leave a detailed message on this number? YES    Call taken on 11/7/2019 at 5:41 PM by Gabino Kwogn

## 2019-11-07 NOTE — TELEPHONE ENCOUNTER
Spoke with patient and she is taking 25 mg 1 tab daily and her script was written as 25mg and to take 0.5mg daily. Patient has two pills left and needs this refilled asap if you can.

## 2019-11-08 RX ORDER — SERTRALINE HYDROCHLORIDE 25 MG/1
25 TABLET, FILM COATED ORAL DAILY
Qty: 90 TABLET | Refills: 1 | Status: SHIPPED | OUTPATIENT
Start: 2019-11-08 | End: 2020-01-29

## 2019-12-23 DIAGNOSIS — A60.00 RECURRENT GENITAL HERPES: ICD-10-CM

## 2019-12-24 RX ORDER — VALACYCLOVIR HYDROCHLORIDE 500 MG/1
TABLET, FILM COATED ORAL
Qty: 90 TABLET | Refills: 0 | Status: SHIPPED | OUTPATIENT
Start: 2019-12-24 | End: 2020-03-30

## 2019-12-24 NOTE — TELEPHONE ENCOUNTER
Routing refill request to provider for review/approval because:  Labs not current:  Last creat 2015

## 2019-12-24 NOTE — TELEPHONE ENCOUNTER
"Requested Prescriptions   Pending Prescriptions Disp Refills     valACYclovir (VALTREX) 500 MG tablet [Pharmacy Med Name: VALACYCLOVIR 500MG TABLETS]  Last Written Prescription Date:  10/02/2019  Last Fill Quantity: 90,  # refills: 0   Last Office Visit: 1/25/2019   Future Office Visit:      90 tablet 0     Sig: TAKE 1 TABLET(500 MG) BY MOUTH DAILY       Antivirals for Herpes Protocol Failed - 12/23/2019  7:23 PM        Failed - Normal serum creatinine on file in past 12 months     Recent Labs   Lab Test 07/20/15  1130   CR 0.54             Passed - Patient is age 12 or older        Passed - Recent (12 mo) or future (30 days) visit within the authorizing provider's specialty     Patient has had an office visit with the authorizing provider or a provider within the authorizing providers department within the previous 12 mos or has a future within next 30 days. See \"Patient Info\" tab in inbasket, or \"Choose Columns\" in Meds & Orders section of the refill encounter.              Passed - Medication is active on med list          "

## 2020-01-29 ENCOUNTER — OFFICE VISIT (OUTPATIENT)
Dept: INTERNAL MEDICINE | Facility: CLINIC | Age: 25
End: 2020-01-29
Payer: COMMERCIAL

## 2020-01-29 VITALS
OXYGEN SATURATION: 99 % | BODY MASS INDEX: 23.65 KG/M2 | WEIGHT: 142.1 LBS | DIASTOLIC BLOOD PRESSURE: 70 MMHG | RESPIRATION RATE: 16 BRPM | HEART RATE: 70 BPM | SYSTOLIC BLOOD PRESSURE: 118 MMHG

## 2020-01-29 DIAGNOSIS — Z12.4 SCREENING FOR MALIGNANT NEOPLASM OF CERVIX: Primary | ICD-10-CM

## 2020-01-29 DIAGNOSIS — F41.1 GAD (GENERALIZED ANXIETY DISORDER): ICD-10-CM

## 2020-01-29 DIAGNOSIS — R87.610 PAPANICOLAOU SMEAR OF CERVIX WITH ATYPICAL SQUAMOUS CELLS OF UNDETERMINED SIGNIFICANCE (ASC-US): ICD-10-CM

## 2020-01-29 PROCEDURE — 99214 OFFICE O/P EST MOD 30 MIN: CPT | Performed by: INTERNAL MEDICINE

## 2020-01-29 PROCEDURE — G0145 SCR C/V CYTO,THINLAYER,RESCR: HCPCS | Performed by: INTERNAL MEDICINE

## 2020-01-29 RX ORDER — SERTRALINE HYDROCHLORIDE 25 MG/1
25 TABLET, FILM COATED ORAL DAILY
Qty: 90 TABLET | Refills: 3 | Status: SHIPPED | OUTPATIENT
Start: 2020-01-29 | End: 2021-06-15

## 2020-01-29 ASSESSMENT — ANXIETY QUESTIONNAIRES
GAD7 TOTAL SCORE: 8
IF YOU CHECKED OFF ANY PROBLEMS ON THIS QUESTIONNAIRE, HOW DIFFICULT HAVE THESE PROBLEMS MADE IT FOR YOU TO DO YOUR WORK, TAKE CARE OF THINGS AT HOME, OR GET ALONG WITH OTHER PEOPLE: NOT DIFFICULT AT ALL
7. FEELING AFRAID AS IF SOMETHING AWFUL MIGHT HAPPEN: SEVERAL DAYS
6. BECOMING EASILY ANNOYED OR IRRITABLE: SEVERAL DAYS
1. FEELING NERVOUS, ANXIOUS, OR ON EDGE: MORE THAN HALF THE DAYS
5. BEING SO RESTLESS THAT IT IS HARD TO SIT STILL: NOT AT ALL
3. WORRYING TOO MUCH ABOUT DIFFERENT THINGS: SEVERAL DAYS
2. NOT BEING ABLE TO STOP OR CONTROL WORRYING: SEVERAL DAYS

## 2020-01-29 ASSESSMENT — PATIENT HEALTH QUESTIONNAIRE - PHQ9: 5. POOR APPETITE OR OVEREATING: MORE THAN HALF THE DAYS

## 2020-01-29 NOTE — PROGRESS NOTES
SUBJECTIVE:                                                      HPI: Arlene Cutler is a pleasant 24 year old female who presents with:    History of ASCUS in 2016 and again in 2018. Pap smear in 2019 was normal. Repeat Pap smear DUE.    Patient also needs refills of Zoloft. Patient restarted Zoloft this past fall after her mother was diagnosed with breast cancer.  This event, understandably, exacerbated her anxiety. Since restarting the Zoloft, she has noticed an improvement in her anxiety. She would like to continue at her current dose. She is also interested in counseling.    The medication, allergy, and problem lists have been reviewed and updated as appropriate.     OBJECTIVE:                                                      /70   Pulse 70   Resp 16   Wt 64.5 kg (142 lb 1.6 oz)   LMP 12/14/2019 (Exact Date)   SpO2 99%   BMI 23.65 kg/m    Constitutional: well-appearing  Genitourinary: external genitalia, urethral meatus, and vagina normal; cervix visualized and normal in appearance  Psych: normal judgment and insight; normal mood and affect; recent and remote memory intact    ASSESSMENT/PLAN:                                                      (Z12.4) Screening for malignant neoplasm of cervix  (primary encounter diagnosis)  (R87.610) Papanicolaou smear of cervix with atypical squamous cells of undetermined significance (ASC-US)  Comment: history of ASCUS in 2016 and again in 2018. Pap smear in 2019 was normal.   Plan: pap smear obtained today; follow-up pap per pap tracking team/OB/GYN    (F41.1) KELSEA (generalized anxiety disorder)  Comment: reasonably controlled on Zoloft.  Plan:    - CONTINUE Zoloft.   - referred for counseling - patient will be contacted to schedule.    The instructions on the AVS were discussed and explained to the patient. Patient expressed understanding of instructions.    (Chart documentation was completed, in part, with [employer] voice-recognition software. Even  though reviewed, some grammatical, spelling, and word errors may remain.)    Bri To MD   [address] [address]  T: [phone], F: [phone]

## 2020-01-30 ASSESSMENT — ANXIETY QUESTIONNAIRES: GAD7 TOTAL SCORE: 8

## 2020-02-03 LAB
COPATH REPORT: NORMAL
PAP: NORMAL

## 2020-03-17 ENCOUNTER — TELEPHONE (OUTPATIENT)
Dept: PSYCHOLOGY | Facility: CLINIC | Age: 25
End: 2020-03-17

## 2020-03-17 NOTE — TELEPHONE ENCOUNTER
Left message for client to notify that our session has been changed from an in-person appointment to a telephone appointment. Notified client that insurance may or may not cover telephone visits and encouraged client to reach out to their insurance company with coverage questions.     Zahra CASAS, LGALICJA 3/17/2020

## 2020-03-20 ENCOUNTER — OFFICE VISIT (OUTPATIENT)
Dept: PSYCHOLOGY | Facility: CLINIC | Age: 25
End: 2020-03-20
Payer: COMMERCIAL

## 2020-03-20 DIAGNOSIS — F41.1 GAD (GENERALIZED ANXIETY DISORDER): Primary | ICD-10-CM

## 2020-03-20 PROCEDURE — 90832 PSYTX W PT 30 MINUTES: CPT | Mod: TEL | Performed by: SOCIAL WORKER

## 2020-03-20 ASSESSMENT — COLUMBIA-SUICIDE SEVERITY RATING SCALE - C-SSRS
5. HAVE YOU STARTED TO WORK OUT OR WORKED OUT THE DETAILS OF HOW TO KILL YOURSELF? DO YOU INTEND TO CARRY OUT THIS PLAN?: NO
2. HAVE YOU ACTUALLY HAD ANY THOUGHTS OF KILLING YOURSELF?: NO
4. HAVE YOU HAD THESE THOUGHTS AND HAD SOME INTENTION OF ACTING ON THEM?: NO
4. HAVE YOU HAD THESE THOUGHTS AND HAD SOME INTENTION OF ACTING ON THEM?: NO
ATTEMPT LIFETIME: NO
ATTEMPT PAST THREE MONTHS: NO
3. HAVE YOU BEEN THINKING ABOUT HOW YOU MIGHT KILL YOURSELF?: NO
1. IN THE PAST MONTH, HAVE YOU WISHED YOU WERE DEAD OR WISHED YOU COULD GO TO SLEEP AND NOT WAKE UP?: NO
2. HAVE YOU ACTUALLY HAD ANY THOUGHTS OF KILLING YOURSELF LIFETIME?: NO
5. HAVE YOU STARTED TO WORK OUT OR WORKED OUT THE DETAILS OF HOW TO KILL YOURSELF? DO YOU INTEND TO CARRY OUT THIS PLAN?: NO
1. IN THE PAST MONTH, HAVE YOU WISHED YOU WERE DEAD OR WISHED YOU COULD GO TO SLEEP AND NOT WAKE UP?: NO

## 2020-03-20 NOTE — PROGRESS NOTES
"Telephone Visit- PREPLANNING for DA    Patient Name: Arlene Cutler  Date: 3/20/2020         Service Type: Telephone Visit     The patient has been notified of following:     \"This preplanning telephone visit will be conducted via a call between you and your provider. We have found that certain health care needs can be provided without the need for an office visit.  This service lets us provide the care you need with a short phone conversation- you will not be charged for this service.\"      Session Start Time: 11:00  Session End Time: 11:30     Session Length: 30 min    Session #: 1    Attendees: Client attended alone     DATA    Identifying Information:  Patient is a 24 year old year old, .  Patient was referred for an assessment by Dr. To at [facility].      The patient describes their cultural background as [place].  Cultural influences and impact on patient's life structure, values, norms, and healthcare: close with family.   Patient identified her preferred language to be English. Patient reported they does not need the assistance of an  or other support involved in therapy.  Patient reports they are able to understand written materials.    Chief Complaint:   The reason for seeking services at this time is: \" anxiety \"    The problem(s) began to increase after her mom was diagnosed with breast cancer in July 2019. She stated that she was originally diagnosed with KELSEA when she was in college. Client reported having racing thoughts, difficulty sleeping, muscle tension.  Patient has attempted to resolve these concerns in the past through medication. Patient reports that other professional(s) are currently involved in providing support / services. Client's PCP is also monitoring symptoms.      Medical Issues:  Patient has had a physical exam to rule out medical causes for current symptoms.  Date of last physical exam was within the past year. Client was encouraged to follow up " with PCP if symptoms were to develop. The patient has a [facility] Primary Care Provider, who is named Bri To.  Patient reports no current medical concerns.  They did not report dental concerns.  There are not significant appetite / nutritional concerns / weight changes.  The patient denies the presence of chronic or episodic pain.  Patient does not report a history of head injury / trauma / cognitive impairment.  Client reports getting headaches from muscle tension due to anxiety.    Medication Adherence:  Patient reports taking prescribed medications as prescribed    Patient Allergies:    Allergies   Allergen Reactions     No Known Allergies        Safety Assessment:  Current Safety Concerns:  Tyler Suicide Severity Rating Scale (Lifetime/Recent)  Tyler Suicide Severity Rating (Lifetime/Recent) 3/20/2020   1. Wish to be Dead (Lifetime) No   1. Wish to be Dead (Recent) No   2. Non-Specific Active Suicidal Thoughts (Lifetime) No   2. Non-Specific Active Suicidal Thoughts (Recent) No   3. Active Suicidal Ideation with any Methods (Not Plan) Without Intent to Act (Lifetime) No   3. Active Sucidal Ideation with any Methods (Not Plan) Without Intent to Act (Recent) No   4. Active Suicidal Ideation with Some Intent to Act, Without Specific Plan (Lifetime) No   4. Active Suicidal Ideation with Some Intent to Act, Without Specific Plan (Recent) No   5. Active Suicidal Ideation with Specific Plan and Intent (Lifetime) No   5. Active Suicidal Ideation with Specific Plan and Intent (Recent) No   Actual Attempt (Lifetime) No   Actual Attempt (Past 3 Months) No   Has subject engaged in non-suicidal self-injurious behavior? (Lifetime) No   Has subject engaged in non-suicidal self-injurious behavior? (Past 3 Months) No    (Complete Short Version for this visit)  Patient denies current homicidal ideation and behaviors.  Patient denies current self-injurious ideation and behaviors.    Patient denied risk behaviors  associated with substance use.  Patient denies any high risk behaviors associated with mental health symptoms.  Patient reports the following current concerns for their personal safety: None.  Patient reports there are no firearms in the house.   Patient denies any history of high risk behaviors associated with mental health symptoms.  Patient reports the following protective factors: positive relationships positive social network and positive family connections, forward/future oriented thinking, dedication to family/friends, safe and stable environment, regular sleep, effectively controls impulses, regular physical activity, secure attachment, adherence with prescribed medication, living with other people, daily obligations, structured day, uses community crisis resources, effective problem-solving skills, committment to well-being, sense of meaning, positive social skills, healthy fear of risky behaviors or pain, financial stability, strong sense of self-worth/esteem, sense of personal control or determination and access to a variety of clinical interventions       Chemical Use Review:  Substance Use: Chemical use reviewed, no active concerns identified     Tobacco Use: No current tobacco use.      Stressors:  Access to medications:  Client has medications  Basic Needs Met:  Client's needs are met  Financial  Concerns:  no  Housing Concerns: no      PLAN: (Patient Tasks / Therapist Tasks / Other)  1. Plan for Safety and Risk Management:  Recommended that patient call 911 or go to the local ED should there be a change in any of these risk factors.   2. DA face to face scheduled for:  4/24/2020  3. Does client have access to telehealth?  Yes  4. Resources given are paced breathing skills, using mindfulness daily, psychoeducation on catastrophic thinking.         I have reviewed the note as documented above.  This accurately captures the substance of my conversation with the patient.  Zahra Sheets MSW, LGSW  3/20/2020  Note reviewed and clinical supervision by YESSENIA [facility] 5/1/2020

## 2020-03-22 ENCOUNTER — HEALTH MAINTENANCE LETTER (OUTPATIENT)
Age: 25
End: 2020-03-22

## 2020-03-29 DIAGNOSIS — A60.00 RECURRENT GENITAL HERPES: ICD-10-CM

## 2020-03-29 NOTE — TELEPHONE ENCOUNTER
"Requested Prescriptions   Pending Prescriptions Disp Refills     valACYclovir (VALTREX) 500 MG tablet 90 tablet 0     Sig: Take 1 tablet (500 mg) by mouth daily       Antivirals for Herpes Protocol Failed - 3/29/2020  2:23 PM        Failed - Normal serum creatinine on file in past 12 months     Recent Labs   Lab Test 07/20/15  1130   CR 0.54       Ok to refill medication if creatinine is low          Passed - Patient is age 12 or older        Passed - Recent (12 mo) or future (30 days) visit within the authorizing provider's specialty     Patient has had an office visit with the authorizing provider or a provider within the authorizing providers department within the previous 12 mos or has a future within next 30 days. See \"Patient Info\" tab in inbasket, or \"Choose Columns\" in Meds & Orders section of the refill encounter.              Passed - Medication is active on med list           Last Written Prescription Date:  12/24/2019  Last Fill Quantity: 90,  # refills: 0   Last office visit: 1/29/2020 with prescribing provider:  1/29/2020   Future Office Visit:   Next 5 appointments (look out 90 days)    Apr 24, 2020 12:30 PM CDT  Telephone Visit with [facility] ([facility]) [address] [place] [address] [address]  [facility] [id number] [phone]   May 08, 2020 12:00 PM CDT  Telephone Visit with [facility] ([facility]) [address]  [facility] [id number] [phone]           "

## 2020-03-30 RX ORDER — VALACYCLOVIR HYDROCHLORIDE 500 MG/1
500 TABLET, FILM COATED ORAL DAILY
Qty: 90 TABLET | Refills: 1 | Status: SHIPPED | OUTPATIENT
Start: 2020-03-30 | End: 2020-09-22

## 2020-04-24 ENCOUNTER — VIRTUAL VISIT (OUTPATIENT)
Dept: PSYCHOLOGY | Facility: CLINIC | Age: 25
End: 2020-04-24
Payer: COMMERCIAL

## 2020-04-24 DIAGNOSIS — F41.1 GAD (GENERALIZED ANXIETY DISORDER): Primary | ICD-10-CM

## 2020-04-24 PROCEDURE — 90791 PSYCH DIAGNOSTIC EVALUATION: CPT | Mod: GT | Performed by: SOCIAL WORKER

## 2020-04-24 NOTE — PROGRESS NOTES
"[facility]  Evaluator Name:  Zahra Sheets     Credentials:  MSW, KASSY    PATIENT'S NAME: Arlene Cutler  PREFERRED NAME: Arlene  PREFERRED PRONOUNS:       MRN:   [id number]  :   1995   ACCT. NUMBER: [id number]  DATE OF SERVICE: 20  START TIME: 12:40  END TIME: 1:20  PREFERRED PHONE: [phone]  May we leave a program related message: Yes    STANDARD ADULT DIAGNOSTIC ASSESSMENT    Telemedicine Visit: The patient's condition can be safely assessed and treated via synchronous audio and visual telemedicine encounter.      Reason for Telemedicine Visit: Services only offered telehealth    Originating Site (Patient Location): Patient's home    Distant Site (Provider Location): Provider Remote Setting home office    Consent:  The patient/guardian has verbally consented to: the potential risks and benefits of telemedicine (video visit) versus in person care; bill my insurance or make self-payment for services provided; and responsibility for payment of non-covered services.     Mode of Communication:  Video Conference via Naiku    As the provider I attest to compliance with applicable laws and regulations related to telemedicine.    Identifying Information:  Patient is a 24 year old, .  The pronoun use throughout this assessment reflects the patient's chosen pronoun.  Patient was referred for an assessment by  at [facility].  Patient attended the session alone.     Chief Complaint:   The reason for seeking services at this time is: \" anxiety \"   The problem(s) began to increase after her mom was diagnosed with breast cancer in 2019. She stated that she was originally diagnosed with KELSEA when she was in college. Client reported having racing thoughts, difficulty sleeping, muscle tension.  Patient has attempted to resolve these concerns in the past through medication. Patient has attempted to resolve these concerns in the past through " medication.    Does the client have any condition that is currently presenting as a potential to harm themselves or others (severe withdrawal, serious medical condition, severe emotional/behavioral problem)? No.  Proceed with assessment.    Social/Family History:  Patient reported they grew up in [place], [place].  They were raised by biological parents until her parents .    over 10 years ago when the client was around 10 or 11 years old. The client's mother did not remarry but has been in a long term relationship for about ten years The client's father is now out of touch with the client and she does not know if he is .   Patient reported that     childhood was difficult after the divorce, as she said her father didn't make any effort to stay in their lives so they stopped trying.  Patient described their current relationships with family of origin as close with her sister who is two years younger.      The patient describes their cultural background as [place], white, raised by single mom from [facility].  Cultural influences and impact on patient's life structure, values, norms, and healthcare: Greatly values family.  Contextual influences on patient's health include: Family Factors raised by mother.    These factors will be addressed in the Preliminary Treatment plan.  Patient identified their preferred language to be English. Patient reported they does not need the assistance of an  or other support involved in therapy.     Patient reported had no significant delays in developmental tasks.   Patient's highest education level was college graduate. Patient identified the following learning problems: none reported.  Modifications will not be used to assist communication in therapy.   Patient reports they are  able to understand written materials.    Patient reported the following relationship history a high school sweetheart, current relationship.  Patient's current relationship  status is partnered / significant other for 2 years.   Patient identified their sexual orientation as heterosexual.  Patient reported having zero child(adrian).     Patient's current living/housing situation involves staying in own home/apartment.  They live with her boyfriend and they report that housing is stable. Patient identified partner, mother, siblings and friends as part of their support system.  Patient identified the quality of these relationships as good.      Patient is currently employed full time and is working from home.  Patient reports their finances are obtained through employment.  Patient does not identify finances as a current stressor.      Patient reported that they have not been involved with the legal system.   Patient denies being on probation / parole / under the jurisdiction of the court.        Patient's Strengths and Limitations:  Patient identified the following strengths or resources that will help them succeed in treatment: family support, insight, intelligence and motivation. Things that may interfere with the patient's success in treatment include: stress.   _______________________________________________  Personal and Family Medical History:   Patient did report a family history of mental health concerns.  Patient reports family history includes Breast Cancer in her maternal aunt and mother; Colon Cancer in her paternal grandfather; Diabetes Type 2  in her maternal grandmother; Hyperlipidemia in her paternal grandmother; Hypertension in her maternal grandfather; Pacemaker in her paternal grandfather..     Patient reported the following previous diagnoses which include(s): an Anxiety Disorder and Depression.  Patient reported symptoms began in adolesence.   Patient has received mental health services in the past: therapy with school and primary care provider at [facility]..  Psychiatric Hospitalizations: None.  Patient denies a history of civil commitment.  Currently, patient is  receiving other mental health services.  These include primary care provider at [facility]  For follow-up on Anxiety and health concerns.   Patient has had a physical exam to rule out medical causes for current symptoms.  Date of last physical exam was within the past year. Client was encouraged to follow up with PCP if symptoms were to develop. The patient has a [facility] Primary Care Provider, who is named Bri To.  Patient reports no current medical concerns.  There are not significant appetite / nutritional concerns / weight changes.   Patient does not report a history of head injury / trauma / cognitive impairment.      Patient reports current meds as:   No outpatient medications have been marked as taking for the 4/24/20 encounter (Virtual Visit) with Zahra Sheets.       Medication Adherence:  Patient reports taking prescribed medications as prescribed.    Patient Allergies:    Allergies   Allergen Reactions     No Known Allergies        Medical History:    Past Medical History:   Diagnosis Date     KELSEA (generalized anxiety disorder)      Papanicolaou smear of cervix with atypical squamous cells of undetermined significance (ASC-US) 08/15/2016, 01/23/2018    see problem list     Recurrent genital herpes          Current Mental Status Exam:   Appearance:  Appropriate    Eye Contact:  Good   Psychomotor:  Normal       Gait / station:  no problem  Attitude / Demeanor: Cooperative  Interested Pleasant  Speech      Rate / Production: Normal/ Responsive      Volume:  Normal  volume      Language:  intact  Mood:   Anxious client appeared anxious discussing her history  Affect:   Appropriate    Thought Content: Clear   Thought Process: Coherent  Logical       Associations: No loosening of associations  Insight:   Fair   Judgment:  Intact   Orientation:  All  Attention/concentration: Good    Rating Scales:    PHQ9:    PHQ-9 SCORE 11/27/2015 8/15/2016 1/23/2018   PHQ-9 Total Score - - -    PHQ-9 Total Score 3 2 2   ;    GAD7:    KELSEA-7 SCORE 8/15/2016 1/23/2018 1/29/2020   Total Score - - -   Total Score 3 8 8     CGI:     First:Considering your total clinical experience with this particular patient population, how severe are the patient's symptoms at this time?: 3 (3/20/2020  1:39 PM)  ;    Most recentNo data recorded    Substance Use:  Patient did not report a family history of substance use concerns; see medical history section for details.  Patient has not received chemical dependency treatment in the past.  Patient has not ever been to detox.      Patient is not currently receiving any chemical dependency treatment. Patient reported the following problems as a result of their substance use: none.    Patient reports using alcohol 3 times per week and has 3-4 mixed drinks at a time. Patient first started drinking at age 17.  Patient reported date of last use was two drinks on Wednesday.  Patient reports heaviest use was in college.  Patient denies using tobacco.  Patient reports using marijuana 2 times per month and smokes 1 at a time. Patient started using marijuana at age college.  Patient reports last use was a week ago.  Patient reports heaviest use was college.  Patient reports using caffeine 2 times per day and drinks 1 at a time. Patient started using caffeine at age college.  Patient reports using/abusing the following substance(s). Patient reported no other substance use.     CAGE- AID:    CAGE-AID Total Score 4/30/2020   Total Score 0       Substance Use: No symptoms    Based on the negative CAGE score and clinical interview there  are not indications of drug or alcohol abuse.      Significant Losses / Trauma / Abuse / Neglect Issues:   Patient did not serve in the .  There are indications or report of significant loss, trauma, abuse or neglect issues related to: divorce / relational changes with her parents and her mom's cancer diagnoses.  Concerns for possible neglect are not  present.     Safety Assessment:   Current Safety Concerns:  [place] Suicide Severity Rating Scale (Lifetime/Recent)  [place] Suicide Severity Rating (Lifetime/Recent) 3/20/2020 4/30/2020   1. Wish to be Dead (Lifetime) No No   1. Wish to be Dead (Recent) No No   2. Non-Specific Active Suicidal Thoughts (Lifetime) No No   2. Non-Specific Active Suicidal Thoughts (Recent) No No   3. Active Suicidal Ideation with any Methods (Not Plan) Without Intent to Act (Lifetime) No No   3. Active Sucidal Ideation with any Methods (Not Plan) Without Intent to Act (Recent) No No   4. Active Suicidal Ideation with Some Intent to Act, Without Specific Plan (Lifetime) No No   4. Active Suicidal Ideation with Some Intent to Act, Without Specific Plan (Recent) No No   5. Active Suicidal Ideation with Specific Plan and Intent (Lifetime) No No   5. Active Suicidal Ideation with Specific Plan and Intent (Recent) No No   Actual Attempt (Lifetime) No No   Actual Attempt (Past 3 Months) No No   Has subject engaged in non-suicidal self-injurious behavior? (Lifetime) No No   Has subject engaged in non-suicidal self-injurious behavior? (Past 3 Months) No No     Patient denies current homicidal ideation and behaviors.  Patient denies current self-injurious ideation and behaviors.    Patient denied risk behaviors associated with substance use.  Patient denies any high risk behaviors associated with mental health symptoms.  Patient reports the following current concerns for their personal safety: None.  Patient reports there are firearms in the house. no firearms.     History of Safety Concerns:  Patient denied a history of homicidal ideation.     Patient denied a history of personal safety concerns.    Patient denied a history of assaultive behaviors.    Patient denied a history of assaultive behaviors.     Patient denied a history of risk behaviors associated with substance use.  Patient denies any history of high risk behaviors associated with  mental health symptoms.  Patient reports the following protective factors: positive relationships positive social network and positive family connections, forward/future oriented thinking, dedication to family/friends, safe and stable environment, regular sleep, effectively controls impulses, regular physical activity, secure attachment, committment to well-being, positive social skills, healthy fear of risky behaviors or pain, financial stability, strong sense of self-worth/esteem, sense of personal control or determination and access to a variety of clinical interventions    Risk Plan:  See Preliminary Treatment Plan for Safety and Risk Management Plan    Review of Symptoms per patient report:  Depression: Difficulties concentrating, Ruminations and Feeling sad, down, or depressed  Wendi:  No Symptoms  Psychosis: No Symptoms  Anxiety: Excessive worry, Nervousness, Physical complaints, such as headaches, stomachaches, muscle tension, Sleep disturbance, Psychomotor agitation, Ruminations, Poor concentration and Irritability  Panic:  No symptoms  Post Traumatic Stress Disorder:  No Symptoms   Eating Disorder: No Symptoms  ADD / ADHD:  No symptoms  Conduct Disorder: No symptoms  Autism Spectrum Disorder: No symptoms  Obsessive Compulsive Disorder: No Symptoms    Patient reports the following compulsive behaviors and treatment history: none.      Diagnostic Criteria:   A. Excessive anxiety and worry about a number of events or activities (such as work or school performance).   B. The person finds it difficult to control the worry.  C. Select 3 or more symptoms (required for diagnosis). Only one item is required in children.   - Restlessness or feeling keyed up or on edge.    - Being easily fatigued.    - Difficulty concentrating or mind going blank.    - Irritability.    - Muscle tension.    - Sleep disturbance (difficulty falling or staying asleep, or restless unsatisfying sleep).   D. The focus of the anxiety and  worry is not confined to features of an Axis I disorder.  E. The anxiety, worry, or physical symptoms cause clinically significant distress or impairment in social, occupational, or other important areas of functioning.   F. The disturbance is not due to the direct physiological effects of a substance (e.g., a drug of abuse, a medication) or a general medical condition (e.g., hyperthyroidism) and does not occur exclusively during a Mood Disorder, a Psychotic Disorder, or a Pervasive Developmental Disorder.    - The aformentioned symptoms began around 15 year(s) ago and occurs 5 days per week and is experienced as moderate.    Functional Status:  Patient reports the following functional impairments: relationship(s), self-care and social interactions.     WHODAS: No flowsheet data found.    Clinical Summary:  1. Reason for assessment: Mental health concerns  .  2. Psychosocial, Cultural and Contextual Factors: Client reported experiencing increased racing thoughts after her mother was diagnosed with cancer  .  3. As evidenced by self report and criteria, client meets the following DSM5 Diagnoses:   (Sustained by DSM5 Criteria Listed Above)  300.02 (F41.1) Generalized Anxiety Disorder.  Other Diagnoses that is relevant to services: Adjustment Disorders  309.28 (F43.23) With mixed anxiety and depressed mood.  4. R/O: 296.32 (F33.1) Major Depressive Disorder, Recurrent Episode, Moderate _ due to not meeting full criteria, client's depressed mood related to mom's diagnosis .   5. Provisional Diagnosis:  300.02 (F41.1) Generalized Anxiety Disorder as evidenced by client presentation of symptoms, length of time symptoms have been present, how persistent symptoms have been over time .  6. Prognosis: Expect Improvement.  7. Likely consequences of symptoms if not treated: increased anxiety which may impact day-to-day functioning and relationships.  8. Client strengths include:  caring, educated, empathetic, employed,  goal-focused, good listener, has a previous history of therapy, intelligent, motivated and wants to learn .     Recommendations:     1. Plan for Safety and Risk Management:Recommended that patient call 911 or go to the local ED should there be a change in any of these risk factors..  Report to child / adult protection services was NA.     2. Patient's identified mental health concerns with a cultural influence will be addressed by reviewing how family of origin impacts client's core belief system.     3. Initial Treatment will focus on: Anxiety - reducing racing thoughts.     4. Resources/Service Plan:       services are not indicated.     Modifications to assist communication are not indicated.     Additional disability accommodations are not indicated.      5. Collaboration:  Collaboration / coordination of treatment will be initiated with the following support professionals: primary care physician.      6.  Referrals:  The following referral(s) will be initiated: none. Next Scheduled Appointment: 5/8/2020.  A Release of Information has been obtained for the following: none.    7. MARIYA: MARIYA:  Discussed the general effects of drugs and alcohol on health and well-being.  Recommendations: monitor for caffeine or alcohol induced anxiety  .     8. Records were reviewed at time of assessment.  Information in this assessment was obtained from the medical record and provided by patient who is a good historian.   Patient will have open access to their mental health medical record.      Eval type:  Mental Health    Zahra CASAS, LGSW 4/24/2020  Note reviewed and clinical supervision by YESSENIA Hancock [facility] 5/1/2020

## 2020-04-30 ASSESSMENT — COLUMBIA-SUICIDE SEVERITY RATING SCALE - C-SSRS
1. IN THE PAST MONTH, HAVE YOU WISHED YOU WERE DEAD OR WISHED YOU COULD GO TO SLEEP AND NOT WAKE UP?: NO
2. HAVE YOU ACTUALLY HAD ANY THOUGHTS OF KILLING YOURSELF?: NO
5. HAVE YOU STARTED TO WORK OUT OR WORKED OUT THE DETAILS OF HOW TO KILL YOURSELF? DO YOU INTEND TO CARRY OUT THIS PLAN?: NO
4. HAVE YOU HAD THESE THOUGHTS AND HAD SOME INTENTION OF ACTING ON THEM?: NO
ATTEMPT PAST THREE MONTHS: NO
2. HAVE YOU ACTUALLY HAD ANY THOUGHTS OF KILLING YOURSELF LIFETIME?: NO
5. HAVE YOU STARTED TO WORK OUT OR WORKED OUT THE DETAILS OF HOW TO KILL YOURSELF? DO YOU INTEND TO CARRY OUT THIS PLAN?: NO
3. HAVE YOU BEEN THINKING ABOUT HOW YOU MIGHT KILL YOURSELF?: NO
1. IN THE PAST MONTH, HAVE YOU WISHED YOU WERE DEAD OR WISHED YOU COULD GO TO SLEEP AND NOT WAKE UP?: NO
ATTEMPT LIFETIME: NO
4. HAVE YOU HAD THESE THOUGHTS AND HAD SOME INTENTION OF ACTING ON THEM?: NO

## 2020-05-08 ENCOUNTER — VIRTUAL VISIT (OUTPATIENT)
Dept: PSYCHOLOGY | Facility: CLINIC | Age: 25
End: 2020-05-08
Payer: COMMERCIAL

## 2020-05-08 DIAGNOSIS — F41.1 GAD (GENERALIZED ANXIETY DISORDER): Primary | ICD-10-CM

## 2020-05-08 PROCEDURE — 90834 PSYTX W PT 45 MINUTES: CPT | Mod: GT | Performed by: SOCIAL WORKER

## 2020-05-08 ASSESSMENT — ANXIETY QUESTIONNAIRES
2. NOT BEING ABLE TO STOP OR CONTROL WORRYING: SEVERAL DAYS
GAD7 TOTAL SCORE: 6
3. WORRYING TOO MUCH ABOUT DIFFERENT THINGS: SEVERAL DAYS
7. FEELING AFRAID AS IF SOMETHING AWFUL MIGHT HAPPEN: SEVERAL DAYS
7. FEELING AFRAID AS IF SOMETHING AWFUL MIGHT HAPPEN: SEVERAL DAYS
4. TROUBLE RELAXING: SEVERAL DAYS
1. FEELING NERVOUS, ANXIOUS, OR ON EDGE: SEVERAL DAYS
GAD7 TOTAL SCORE: 6
6. BECOMING EASILY ANNOYED OR IRRITABLE: SEVERAL DAYS
5. BEING SO RESTLESS THAT IT IS HARD TO SIT STILL: NOT AT ALL

## 2020-05-08 ASSESSMENT — PATIENT HEALTH QUESTIONNAIRE - PHQ9
SUM OF ALL RESPONSES TO PHQ QUESTIONS 1-9: 3
SUM OF ALL RESPONSES TO PHQ QUESTIONS 1-9: 3
10. IF YOU CHECKED OFF ANY PROBLEMS, HOW DIFFICULT HAVE THESE PROBLEMS MADE IT FOR YOU TO DO YOUR WORK, TAKE CARE OF THINGS AT HOME, OR GET ALONG WITH OTHER PEOPLE: SOMEWHAT DIFFICULT

## 2020-05-08 NOTE — LETTER
Catracho Jacobs, it was good to see you today! Here are the articles I had talked about.    15 Common Cognitive Distortions  [email or web address]      10 Proven Methods for Fixing Cognitive Distortions  [email or web address]    Call with any questions!  Zahra

## 2020-05-08 NOTE — PROGRESS NOTES
Answers for HPI/ROS submitted by the patient on 5/8/2020   KELSEA 7 TOTAL SCORE: 6  If you checked off any problems, how difficult have these problems made it for you to do your work, take care of things at home, or get along with other people?: Somewhat difficult  PHQ9 TOTAL SCORE: 3                                               Progress Note    Patient Name: Arlene Cutler  Date: 5/8/2020         Service Type: Individual      Session Start Time: 12:00  Session End Time: 12:45     Session Length: 45 min    Session #: 3    Attendees: Client attended alone    Telemedicine Visit: The patient's condition can be safely assessed and treated via synchronous audio and visual telemedicine encounter.      Reason for Telemedicine Visit: Patient has requested telehealth visit    Originating Site (Patient Location): Patient's home    Distant Site (Provider Location): Provider Remote Setting home office    Consent:  The patient/guardian has verbally consented to: the potential risks and benefits of telemedicine (video visit) versus in person care; bill my insurance or make self-payment for services provided; and responsibility for payment of non-covered services.      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   Current: Client reported that she had been practicing  paced breathing every day and that she was finding it effective in reducing her anxiety. She said she was still preparing for her exam and while she was confident she knew the material she hadn't anticipated taking it during the pandemic. Client discussed goals she had for therapy and created treatment plan.        Treatment Objective(s) Addressed in This Session:   Improve concentration, focus, and mindfulness in daily activities        Intervention:   Motivational Interviewing: identified goals for therapy        ASSESSMENT: Current Emotional / Mental Status (status of significant symptoms):   Risk status (Self / Other harm or suicidal ideation)   Patient denies current fears or concerns for personal safety.   Patient denies current or recent suicidal ideation or behaviors.   Patient denies current or recent homicidal ideation or behaviors.   Patient denies current or recent self injurious behavior or ideation.   Patient denies other safety concerns.   Patient reports there has been no change in risk factors since their last session.     Patient reports there has been no change in protective factors since their last session.     Recommended that patient call 911 or go to the local ED should there be a change in any of these risk factors.     Appearance:   Appropriate    Eye Contact:   Good    Psychomotor Behavior: Normal    Attitude:   Cooperative  Friendly Attentive   Orientation:   All   Speech    Rate / Production: Normal/ Responsive    Volume:  Normal    Mood:    Anxious  Normal client appeared nervous discussing her goals   Affect:    Appropriate    Thought Content:  Clear    Thought Form:  Coherent  Logical    Insight:    Good      Medication Review:   No changes to current psychiatric medication(s)     Medication Compliance:   Yes     Changes in Health Issues:   None reported     Chemical Use Review:   Substance Use: Chemical use reviewed, no active concerns identified      Tobacco Use: No current tobacco  use.      Diagnosis:  1. KELSEA (generalized anxiety disorder)        Collateral Reports Completed:   Not Applicable    PLAN: (Patient Tasks / Therapist Tasks / Other)  Client will continue practicing paced breathing, read handouts on cognitive distortions and return for therapy in two weeks.        Zahra CASAS, SW 5/8/2020  Note reviewed and clinical supervision by [facility]ESSENIA Hancock [facility] 6/1/[facility]   ______________________________________________________________________    Treatment Plan    Patient's Name: Arlene Cutler  YOB: 1995    Date: 5/8/2020    DSM5 Diagnoses: 300.02 (F41.1) Generalized Anxiety Disorder  Psychosocial / Contextual Factors: Client reported ongoing anxiety throughout her life that is exacerbated by her mother's illness and her stress while pursuing her degree  WHODAS: assigned on [facility]    Referral / Collaboration:  Referral to another professional/service is not indicated at this time..    Anticipated number of session or this episode of care: 5-8      MeasurableTreatment Goal(s) related to diagnosis / functional impairment(s)  Goal 1: Patient will reduce frequency, duration, and intensity of racing thoughts as evidenced by client reporting daily use of effective coping skill over the next three months.    I will know I've met my goal when it's a habit.      Objective #A (Patient Action)    Patient will Improve concentration, focus, and mindfulness in daily activities .  Status: New - Date: 5/8/2020     Intervention(s)  Therapist will teach mindfulness skills to increase awareness and ability to be present .    Objective #B  Patient will identify and address phsyical presentation of anxiety.  Status: New - Date: 5/8/2020     Intervention(s)  Therapist will teach emotional recognition/identification. DBT emotion identification module, identifying physical symptoms.    Objective #C  Patient will Identify negative self-talk and behaviors: challenge core beliefs,  myths, and actions.  Status: New - Date: 5/8/2020     Intervention(s)  Therapist will teach of CBT core belief systems, identify how core beliefs contribute to her anxiety.      Goal 2: Patient will reduce irritability when stressed as evidenced by client reporting use of three new effective communication skills and increased empathy over the next three months.    I will know I've met my goal when I can handle problems with better strategies.      Objective #A (Patient Action)    Status: New - Date: 5/8/2020     Patient will identify cause of irritabilty.    Intervention(s)  Therapist will teach model for understanding emotion.    Objective #B  Patient will use opposite action to reduce irritability.    Status: New - Date: 5/8/2020     Intervention(s)  Therapist will teach opposite action module.    Objective #C  Patient will learn & utilize at least 3 assertive communication skills weekly.  Status: New - Date: 5/8/2020     Intervention(s)  Therapist will teach DEAR MAN, GIVE and FAST DBT skills.      Goal 3: Patiient will improve quality of sleep as evidenced by client reporting an average of 8 hours a night of quality sleep reported through her FitBit and maintinang a  consistent schedule.    I will know I've met my goal when I have more consistent sleep scores.      Objective #A (Patient Action)    Status: New - Date: 5/8/2020     Patient will identify 4 sleep hygiene practices.    Intervention(s)  Therapist will teach DBT sleep hygiene protocol.    Objective #B  Patient will identify and maintain motivation to sleep.    Status: New - Date: 5/8/2020     Intervention(s)  Therapist will teach motivational interviewing techniques .    Objective #C  Patient will relaxation exercises.  Status: New - Date: 5/8/2020     Intervention(s)  Therapist will teach self soothing DBT skills.      Patient has reviewed and agreed to the above plan.      Zahra Sheets MSW, LGSW May 8, 2020  Note reviewed and clinical supervision by  YESSENIA Hancock LICSW 6/1/2020

## 2020-05-09 ASSESSMENT — ANXIETY QUESTIONNAIRES: GAD7 TOTAL SCORE: 6

## 2020-05-09 ASSESSMENT — PATIENT HEALTH QUESTIONNAIRE - PHQ9: SUM OF ALL RESPONSES TO PHQ QUESTIONS 1-9: 3

## 2020-05-22 ENCOUNTER — VIRTUAL VISIT (OUTPATIENT)
Dept: PSYCHOLOGY | Facility: CLINIC | Age: 25
End: 2020-05-22
Payer: COMMERCIAL

## 2020-05-22 ENCOUNTER — VIRTUAL VISIT (OUTPATIENT)
Dept: FAMILY MEDICINE | Facility: OTHER | Age: 25
End: 2020-05-22

## 2020-05-22 DIAGNOSIS — F41.1 GAD (GENERALIZED ANXIETY DISORDER): Primary | ICD-10-CM

## 2020-05-22 DIAGNOSIS — Z20.822 SUSPECTED COVID-19 VIRUS INFECTION: Primary | ICD-10-CM

## 2020-05-22 PROCEDURE — 90832 PSYTX W PT 30 MINUTES: CPT | Mod: GT | Performed by: SOCIAL WORKER

## 2020-05-22 PROCEDURE — 99207 ZZC NO BILLABLE SERVICE THIS VISIT: CPT

## 2020-05-22 PROCEDURE — 99000 SPECIMEN HANDLING OFFICE-LAB: CPT | Performed by: FAMILY MEDICINE

## 2020-05-22 PROCEDURE — 87635 SARS-COV-2 COVID-19 AMP PRB: CPT | Mod: 90 | Performed by: FAMILY MEDICINE

## 2020-05-22 NOTE — PROGRESS NOTES
Answers for HPI/ROS submitted by the patient on 5/8/2020   KESLEA 7 TOTAL SCORE: 6  If you checked off any problems, how difficult have these problems made it for you to do your work, take care of things at home, or get along with other people?: Somewhat difficult  PHQ9 TOTAL SCORE: 3                                               Progress Note    Patient Name: Arlene Cutler  Date: 5/22/2020         Service Type: Individual      Session Start Time: 12:00  Session End Time: 12:30     Session Length: 30 min    Session #: 4    Attendees: Client attended alone    Telemedicine Visit: The patient's condition can be safely assessed and treated via synchronous audio and visual telemedicine encounter.      Reason for Telemedicine Visit: Patient has requested telehealth visit    Originating Site (Patient Location): Patient's home    Distant Site (Provider Location): Provider Remote Setting home office    Consent:  The patient/guardian has verbally consented to: the potential risks and benefits of telemedicine (video visit) versus in person care; bill my insurance or make self-payment for services provided; and responsibility for payment of non-covered services.      Treatment Plan Last Reviewed: 5/8/2020  PHQ-9 / KELSEA-7 : 5/8/2020    DATA  Interactive Complexity: No  Crisis: No       Progress Since Last Session (Related to Symptoms / Goals / Homework):   Symptoms: No change anxiety remains reduced]    Homework: Achieved / completed to satisfaction client read handouts      Episode of Care Goals: Satisfactory progress - ACTION (Actively working towards change); Intervened by reinforcing change plan / affirming steps taken     Current / Ongoing Stressors and Concerns:   Ongoing: Client reported increased anxiety after mom was diagnosed with breast cancer and 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 She described how the test had to go differently than she anticipated due to the pandemic but that she felt like it didn't hurt her overall score. Client reported that she will be moving to an apartment with her boyfriend in [place] and that she is looking forward to living closer to her sister. She said she has still been unable to see her mom as a resident the client has been inerating with at work has tested positively for coronavirus, and the client cannot be around her mom due to her cancer. Client said that she knows her mom will be okay as she had been feeling better lately which was comfortng to the client. She reported reading about cognitive distortions and that many of them sound familiar to her but she was unable to identify the thoughts in the moment.        Treatment Objective(s) Addressed in This Session:   Identify negative self-talk and behaviors: challenge core beliefs, myths, and actions  Improve concentration, focus, and mindfulness in daily activities        Intervention:   CBT: Reviewed handouts on cognitive distortions  Motivational Interviewing    MI Intervention: Supported Autonomy, Collaboration, Evocation, Permission to raise concern or advise, Reflections: simple and complex, Change talk (evoked) and Reframe     Change Talk Expressed by the Patient: Ability to change Reasons to change Committment to change Activation Taking steps    Provider Response to Change Talk: E - Evoked more info from patient about behavior change, A - Affirmed patient's thoughts, decisions, or attempts at behavior change, R - Reflected patient's change talk and S - Summarized patient's change talk statements          ASSESSMENT: Current Emotional / Mental Status (status of significant symptoms):   Risk status (Self / Other harm or suicidal ideation)   Patient denies current fears or concerns for personal safety.   Patient denies current or recent suicidal ideation  or behaviors.   Patient denies current or recent homicidal ideation or behaviors.   Patient denies current or recent self injurious behavior or ideation.   Patient denies other safety concerns.   Patient reports there has been no change in risk factors since their last session.     Patient reports there has been no change in protective factors since their last session.     Recommended that patient call 911 or go to the local ED should there be a change in any of these risk factors.     Appearance:   Appropriate    Eye Contact:   Good    Psychomotor Behavior: Normal    Attitude:   Cooperative  Friendly Attentive   Orientation:   All   Speech    Rate / Production: Normal/ Responsive    Volume:  Normal    Mood:    Anxious  Normal client presented with some nervousness about future   Affect:    Appropriate    Thought Content:  Clear    Thought Form:  Coherent  Logical    Insight:    Good      Medication Review:   No changes to current psychiatric medication(s)     Medication Compliance:   Yes     Changes in Health Issues:   None reported     Chemical Use Review:   Substance Use: Chemical use reviewed, no active concerns identified      Tobacco Use: No current tobacco use.      Diagnosis:  1. KELSEA (generalized anxiety disorder)        Collateral Reports Completed:   Not Applicable    PLAN: (Patient Tasks / Therapist Tasks / Other)  Client will re-read handouts, identify specific distortion to practice noting and return for therapy in two weeks.        Zahra CASAS, [facility] 5/22/2020  Note reviewed and clinical supervision by YESSENIA Hancock [facility] 6/8/2020                                                    ______________________________________________________________________    Treatment Plan    Patient's Name: Arlene Cutler  YOB: 1995    Date: 5/8/2020    DSM5 Diagnoses: 300.02 (F41.1) Generalized Anxiety Disorder  Psychosocial / Contextual Factors: Client reported ongoing anxiety throughout  her life that is exacerbated by her mother's illness and her stress while pursuing her degree  WHODAS: assigned on [facility]    Referral / Collaboration:  Referral to another professional/service is not indicated at this time..    Anticipated number of session or this episode of care: 5-8      MeasurableTreatment Goal(s) related to diagnosis / functional impairment(s)  Goal 1: Patient will reduce frequency, duration, and intensity of racing thoughts as evidenced by client reporting daily use of effective coping skill over the next three months.    I will know I've met my goal when it's a habit.      Objective #A (Patient Action)    Patient will Improve concentration, focus, and mindfulness in daily activities .  Status: New - Date: 5/8/2020     Intervention(s)  Therapist will teach mindfulness skills to increase awareness and ability to be present .    Objective #B  Patient will identify and address phsyical presentation of anxiety.  Status: New - Date: 5/8/2020     Intervention(s)  Therapist will teach emotional recognition/identification. DBT emotion identification module, identifying physical symptoms.    Objective #C  Patient will Identify negative self-talk and behaviors: challenge core beliefs, myths, and actions.  Status: New - Date: 5/8/2020     Intervention(s)  Therapist will teach of CBT core belief systems, identify how core beliefs contribute to her anxiety.      Goal 2: Patient will reduce irritability when stressed as evidenced by client reporting use of three new effective communication skills and increased empathy over the next three months.    I will know I've met my goal when I can handle problems with better strategies.      Objective #A (Patient Action)    Status: New - Date: 5/8/2020     Patient will identify cause of irritabilty.    Intervention(s)  Therapist will teach model for understanding emotion.    Objective #B  Patient will use opposite action to reduce irritability.    Status: New -  Date: 5/8/2020     Intervention(s)  Therapist will teach opposite action module.    Objective #C  Patient will learn & utilize at least 3 assertive communication skills weekly.  Status: New - Date: 5/8/2020     Intervention(s)  Therapist will teach DEAR MAN, GIVE and FAST DBT skills.      Goal 3: Patiient will improve quality of sleep as evidenced by client reporting an average of 8 hours a night of quality sleep reported through her FitBit and maintinang a  consistent schedule.    I will know I've met my goal when I have more consistent sleep scores.      Objective #A (Patient Action)    Status: New - Date: 5/8/2020     Patient will identify 4 sleep hygiene practices.    Intervention(s)  Therapist will teach DBT sleep hygiene protocol.    Objective #B  Patient will identify and maintain motivation to sleep.    Status: New - Date: 5/8/2020     Intervention(s)  Therapist will teach motivational interviewing techniques .    Objective #C  Patient will relaxation exercises.  Status: New - Date: 5/8/2020     Intervention(s)  Therapist will teach self soothing DBT skills.      Patient has reviewed and agreed to the above plan.      Zahra CASAS, LGSW May 8, 2020  Note reviewed and clinical supervision by [facility]ENIA [facility]

## 2020-05-22 NOTE — PROGRESS NOTES
"Date: 2020 11:00:47  Clinician: Allegra Mueller  Clinician NPI: [id number]  Patient: Arlene Cutler  Patient : 1995  Patient Address: [facility], [place] [address]  Patient Phone: [phone]  Visit Protocol: URI  Patient Summary:  Arlene is a 24 year old ( : 1995 ) female who initiated a Visit for COVID-19 (Coronavirus) evaluation and screening. When asked the question \"Please sign me up to receive news, health information and promotions. \", Arlene responded \"No\".    Arlene states her symptoms started gradually 3-4 days ago. After her symptoms started, they improved and then got worse again.   Her symptoms consist of chills, a cough, rhinitis, and a headache.   Symptom details     Nasal secretions: The color of her mucus is clear.    Cough: Arlene coughs a few times an hour and her cough is more bothersome at night. Phlegm does not come into her throat when she coughs. She does not believe her cough is caused by post-nasal drip.     Headache: She states the headache is mild (1-3 on a 10 point pain scale).      Arlene denies having nausea, teeth pain, ageusia, diarrhea, facial pain or pressure, sore throat, wheezing, fever, nasal congestion, vomiting, ear pain, malaise, myalgias, and anosmia. She also denies having recent facial or sinus surgery in the past 60 days, taking antibiotic medication for the symptoms, and having a sinus infection within the past year. She is not experiencing dyspnea.   Precipitating events  She has not recently been exposed to someone with influenza. Arlene has not been in close contact with any high risk individuals.   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   She does not work or volunteer as healthcare worker or a  and does not work or volunteer in a healthcare facility.   Arlene has not lived in a congregate living setting in the past 14 days. She does not live " with a healthcare worker.   Arlene has had a close contact with a laboratory-confirmed COVID-19 patient within 14 days of symptom onset. She was exposed at her work. Additional information about contact with COVID-19 (Coronavirus) patient as reported by the patient (free text): [employer] 5/11   Pertinent medical history  Arlene does not get yeast infections when she takes antibiotics.   Arlene does not need a return to work/school note.   Weight: 140 lbs   Arlene does not smoke or use smokeless tobacco.   She denies pregnancy and denies breastfeeding. She has menstruated in the past month.   Weight: 140 lbs    MEDICATIONS: valacyclovir oral, sertraline oral, ALLERGIES: NKDA  Clinician Response:  Dear Arlene,   Dear Arlene  Your symptoms show that you may have coronavirus (COVID-19). This illness can cause fever, cough and trouble breathing. Many people get a mild case and get better on their own. Some people can get very sick.  What should I do?  We would like to test you for this virus. This will be a curbside test done outside the clinic.  Please call [phone] to schedule your visit. Explain that [facility] have [facility] COVID-19 test. Be ready to share your [facility] visit ID number.  Starting now:  Stay at least 6 feet away from others. (If someone will drive you to your test, stay in the backseat, as far away from the  as you can.)   Don't go to work, school or anywhere else. When it's time for your test, go straight to the testing site. Don't make any stops on the way there or back.   Wash your hands and face often. Use soap and water.   Cover your mouth and nose with a mask, tissue or washcloth.   Don't touch anyone. No hugging, kissing or handshakes.  While at home   Stay home and away from others (self-isolate) until:  You've had no fever---and no medicine that reduces fever---for 3 full days (72 hours). And...  Your other symptoms have gotten better. For example, your cough or  "breathing has improved. And...  At least 10 days have passed since your symptoms started.  During this time:  Stay in your own room (and use your own bathroom), if you can.  Don't go to work, school or anywhere else.  Stay away from others in your home. No hugging, kissing or shaking hands.  Don't let anyone visit.  Cover your mouth and nose with a mask, tissue or washcloth to avoid spreading germs.  Clean \"high touch\" surfaces often (doorknobs, counters, handles, etc.). Use a household cleaning spray or wipes.  Wash your hands and face often. Use soap and water.  How can I take care of myself?  1. Get lots of rest. Drink extra fluids (unless your doctor has told you not to).  2. Take Tylenol (acetaminophen) for fever or pain. If you have liver or kidney problems, ask your family doctor if it's okay to take Tylenol.  Adults can take either:   650 mg (two 325 mg pills) every 4 to 6 hours, or...  1,000 mg (two 500 mg pills) every 8 hours as needed.   Note: Don't take more than 3,000 mg in one day.   Acetaminophen is found in many medicines (both prescribed and over-the-counter medicines). Read all labels to be sure you don't take too much.   For children, check the Tylenol bottle for the right dose. The dose is based on the child's age or weight.  3. If you have other health problems (like cancer, heart failure, an organ transplant or severe kidney disease): Call your specialty clinic if you don't feel better in the next 2 days.  4. Know when to call 911: If your breathing is so bad that it keeps you from doing normal activities, call 911 or go to the emergency room. Tell them that you've been staying home and may have COVID-19.  5. Sign up for [employer]. We know it's scary to hear that you might have COVID-19. We want to track your symptoms to make sure you're okay over the next 2 weeks. Please look for an email from [employer]---this is a free, online program that we'll use to keep in touch. To sign up, follow " the link in the email. Learn more at [email or web address].  6. The following will serve as your written order for this Covid Test ordered by me for the indication of suspected Covid [Z20.828]: The test will be ordered in [employer], our electronic health record after you are scheduled and will show as ordered and authorized by Michele Francois MD   Order: Covid-19 (Coronavirus) PCR [facility]IC [facility]  Where can I get more information?  To learn more about COVID-19 and how to care for yourself at home, please visit the CDC website at https://www.cdc.gov/coronavirus/2019-ncov/about/steps-when-sick.html.  For more about your care at [facility], please visit [email or web address]  If you'd like to be part of a COVID-19 clinical trial (research study) at the [facility], go to [email or web address] for details.    Diagnosis: Cough  Diagnosis ICD: R05

## 2020-05-23 LAB
SARS-COV-2 RNA SPEC QL NAA+PROBE: NOT DETECTED
SPECIMEN SOURCE: NORMAL

## 2020-06-05 ENCOUNTER — VIRTUAL VISIT (OUTPATIENT)
Dept: PSYCHOLOGY | Facility: CLINIC | Age: 25
End: 2020-06-05
Payer: COMMERCIAL

## 2020-06-05 DIAGNOSIS — F41.1 GAD (GENERALIZED ANXIETY DISORDER): Primary | ICD-10-CM

## 2020-06-05 PROCEDURE — 90832 PSYTX W PT 30 MINUTES: CPT | Mod: GT | Performed by: SOCIAL WORKER

## 2020-06-05 NOTE — PROGRESS NOTES
Answers for HPI/ROS submitted by the patient on 5/8/2020   KELSEA 7 TOTAL SCORE: 6  If you checked off any problems, how difficult have these problems made it for you to do your work, take care of things at home, or get along with other people?: Somewhat difficult  PHQ9 TOTAL SCORE: 3                                               Progress Note    Patient Name: Arlene Cutler  Date: 6/5/2020         Service Type: Individual      Session Start Time: 12:00  Session End Time: 12:30     Session Length: 30 min    Session #: 4    Attendees: Client attended alone    Telemedicine Visit: The patient's condition can be safely assessed and treated via synchronous audio and visual telemedicine encounter.      Reason for Telemedicine Visit: Patient has requested telehealth visit    Originating Site (Patient Location): Patient's home    Distant Site (Provider Location): Provider Remote Setting home office    Consent:  The patient/guardian has verbally consented to: the potential risks and benefits of telemedicine (video visit) versus in person care; bill my insurance or make self-payment for services provided; and responsibility for payment of non-covered services.      Treatment Plan Last Reviewed: 5/8/2020  PHQ-9 / KELSEA-7 : 5/8/2020    DATA  Interactive Complexity: No  Crisis: No       Progress Since Last Session (Related to Symptoms / Goals / Homework):   Symptoms: Improving reduced anxiety    Homework: Achieved / completed to satisfaction client did not read handouts      Episode of Care Goals: Satisfactory progress - ACTION (Actively working towards change); Intervened by reinforcing change plan / affirming steps taken     Current / Ongoing Stressors and Concerns:   Ongoing: Client reported increased anxiety after mom was diagnosed with breast cancer and 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    Current: Client reported that she passed her exam and feels like she can finally relax. She  stated that her mom was continuing to get better which makes her feel comforted. Client said she also got tested and is negative for COVID after some residents at her work tested positive. She reported that her move to [place] with her boyfriend went well and she likes her new apartment. She reported with her move she had not been able to read handouts on cognitive distortions but that she intends to as she thinks it would be helpful.        Treatment Objective(s) Addressed in This Session:   Identify negative self-talk and behaviors: challenge core beliefs, myths, and actions  Improve concentration, focus, and mindfulness in daily activities        Intervention:     Motivational Interviewing    MI Intervention: Supported Autonomy, Collaboration, Evocation, Permission to raise concern or advise, Open-ended questions, Reflections: simple and complex, Change talk (evoked) and Reframe     Change Talk Expressed by the Patient: Ability to change Reasons to change Committment to change Activation Taking steps    Provider Response to Change Talk: E - Evoked more info from patient about behavior change, A - Affirmed patient's thoughts, decisions, or attempts at behavior change, R - Reflected patient's change talk and S - Summarized patient's change talk statements          ASSESSMENT: Current Emotional / Mental Status (status of significant symptoms):   Risk status (Self / Other harm or suicidal ideation)   Patient denies current fears or concerns for personal safety.   Patient denies current or recent suicidal ideation or behaviors.   Patient denies current or recent homicidal ideation or behaviors.   Patient denies current or recent self injurious behavior or ideation.   Patient denies other safety concerns.   Patient reports there has been no change in risk factors since their last session.     Patient reports there has been no change in protective factors since their last session.     Recommended that patient  call 911 or go to the local ED should there be a change in any of these risk factors.     Appearance:   Appropriate    Eye Contact:   Good    Psychomotor Behavior: Normal    Attitude:   Cooperative  Friendly Attentive   Orientation:   All   Speech    Rate / Production: Normal/ Responsive    Volume:  Normal    Mood:    Normal client appeared to be in a good mood   Affect:    Appropriate    Thought Content:  Clear    Thought Form:  Coherent  Logical    Insight:    Good      Medication Review:   No changes to current psychiatric medication(s)     Medication Compliance:   Yes     Changes in Health Issues:   None reported     Chemical Use Review:   Substance Use: Chemical use reviewed, no active concerns identified      Tobacco Use: No current tobacco use.      Diagnosis:  1. KELSEA (generalized anxiety disorder)        Collateral Reports Completed:   Not Applicable    PLAN: (Patient Tasks / Therapist Tasks / Other)  Client will re-read handouts, identify specific distortion to practice noting and return for therapy in two weeks.        Zahra CASAS, [facility] 6/5/2020   Note reviewed and clinical supervision by YESSENIA Hancock [facility] 6/22/2020   ______________________________________________________________________    Treatment Plan    Patient's Name: Arlene Cutler  YOB: 1995    Date: 5/8/2020    DSM5 Diagnoses: 300.02 (F41.1) Generalized Anxiety Disorder  Psychosocial / Contextual Factors: Client reported ongoing anxiety throughout her life that is exacerbated by her mother's illness and her stress while pursuing her degree  WHODAS: assigned on [facility]    Referral / Collaboration:  Referral to another professional/service is not indicated at this time..    Anticipated number of session or this episode of care: 5-8      MeasurableTreatment Goal(s) related to diagnosis / functional impairment(s)  Goal 1: Patient will reduce frequency, duration, and intensity of racing thoughts as evidenced by  client reporting daily use of effective coping skill over the next three months.    I will know I've met my goal when it's a habit.      Objective #A (Patient Action)    Patient will Improve concentration, focus, and mindfulness in daily activities .  Status: New - Date: 5/8/2020     Intervention(s)  Therapist will teach mindfulness skills to increase awareness and ability to be present .    Objective #B  Patient will identify and address phsyical presentation of anxiety.  Status: New - Date: 5/8/2020     Intervention(s)  Therapist will teach emotional recognition/identification. DBT emotion identification module, identifying physical symptoms.    Objective #C  Patient will Identify negative self-talk and behaviors: challenge core beliefs, myths, and actions.  Status: New - Date: 5/8/2020     Intervention(s)  Therapist will teach of CBT core belief systems, identify how core beliefs contribute to her anxiety.      Goal 2: Patient will reduce irritability when stressed as evidenced by client reporting use of three new effective communication skills and increased empathy over the next three months.    I will know I've met my goal when I can handle problems with better strategies.      Objective #A (Patient Action)    Status: New - Date: 5/8/2020     Patient will identify cause of irritabilty.    Intervention(s)  Therapist will teach model for understanding emotion.    Objective #B  Patient will use opposite action to reduce irritability.    Status: New - Date: 5/8/2020     Intervention(s)  Therapist will teach opposite action module.    Objective #C  Patient will learn & utilize at least 3 assertive communication skills weekly.  Status: New - Date: 5/8/2020     Intervention(s)  Therapist will teach DEAR MAN, GIVE and FAST DBT skills.      Goal 3: Patiient will improve quality of sleep as evidenced by client reporting an average of 8 hours a night of quality sleep reported through her Myrtle and marilu a   consistent schedule.    I will know I've met my goal when I have more consistent sleep scores.      Objective #A (Patient Action)    Status: New - Date: 5/8/2020     Patient will identify 4 sleep hygiene practices.    Intervention(s)  Therapist will teach DBT sleep hygiene protocol.    Objective #B  Patient will identify and maintain motivation to sleep.    Status: New - Date: 5/8/2020     Intervention(s)  Therapist will teach motivational interviewing techniques .    Objective #C  Patient will relaxation exercises.  Status: New - Date: 5/8/2020     Intervention(s)  Therapist will teach self soothing DBT skills.      Patient has reviewed and agreed to the above plan.      Zahra CASAS, LGSW May 8, 2020  Note reviewed and clinical supervision by [facility]

## 2020-07-09 ENCOUNTER — VIRTUAL VISIT (OUTPATIENT)
Dept: PSYCHOLOGY | Facility: CLINIC | Age: 25
End: 2020-07-09
Payer: COMMERCIAL

## 2020-07-09 DIAGNOSIS — F41.1 GAD (GENERALIZED ANXIETY DISORDER): Primary | ICD-10-CM

## 2020-07-09 PROCEDURE — 90832 PSYTX W PT 30 MINUTES: CPT | Mod: GT | Performed by: SOCIAL WORKER

## 2020-07-09 NOTE — PROGRESS NOTES
Answers for HPI/ROS submitted by the patient on 5/8/2020   KELSEA 7 TOTAL SCORE: 6  If you checked off any problems, how difficult have these problems made it for you to do your work, take care of things at home, or get along with other people?: Somewhat difficult  PHQ9 TOTAL SCORE: 3                                               Progress Note    Patient Name: Arlene Cutler  Date: 7/9/2020         Service Type: Individual      Session Start Time: 4:30  Session End Time: 5:00     Session Length: 30 min    Session #: 5    Attendees: Client attended alone    Telemedicine Visit: The patient's condition can be safely assessed and treated via synchronous audio and visual telemedicine encounter.      Reason for Telemedicine Visit: Services only offered telehealth    Originating Site (Patient Location): Patient's home    Distant Site (Provider Location): Provider Remote Setting home office    Consent:  The patient/guardian has verbally consented to: the potential risks and benefits of telemedicine (video visit) versus in person care; bill my insurance or make self-payment for services provided; and responsibility for payment of non-covered services.     Mode of Communication:  Video Conference via [employer]    As the provider I attest to compliance with applicable laws and regulations related to telemedicine.     Treatment Plan Last Reviewed: 5/8/2020  PHQ-9 / KELSEA-7 : 5/8/2020    DATA  Interactive Complexity: No  Crisis: No       Progress Since Last Session (Related to Symptoms / Goals / Homework):   Symptoms: No change continued low anxiety    Homework: Achieved / completed to satisfaction client practiced noting anxiety and distortions      Episode of Care Goals: Satisfactory progress - ACTION (Actively working towards change); Intervened by reinforcing change plan / affirming steps taken     Current / Ongoing Stressors and Concerns:   Ongoing: Client reported increased anxiety after mom was diagnosed with breast  cancer and 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    Current: Client reported having an outdoor fourth of July gathering and that it was fun but nerve-wracking. She reported drinking more than she usually would have and still feeling impacted, saying she feels sluggish and has low motivation. Client said her anxiety had been doing okay but that since she binge drank her anxiety did increase. She identified having increased anxiety about covid and having a hard time finding a job with her new license. Client described feeling pressure as she gets off her mom's health insurance by the end of the year.         Treatment Objective(s) Addressed in This Session:   Identify negative self-talk and behaviors: challenge core beliefs, myths, and actions  Improve concentration, focus, and mindfulness in daily activities        Intervention:     Motivational Interviewing    MI Intervention: Supported Autonomy, Collaboration, Evocation, Permission to raise concern or advise, Open-ended questions, Reflections: simple and complex, Change talk (evoked) and Reframe     Change Talk Expressed by the Patient: Ability to change Reasons to change Committment to change Activation Taking steps    Provider Response to Change Talk: E - Evoked more info from patient about behavior change, A - Affirmed patient's thoughts, decisions, or attempts at behavior change, R - Reflected patient's change talk and S - Summarized patient's change talk statements          ASSESSMENT: Current Emotional / Mental Status (status of significant symptoms):   Risk status (Self / Other harm or suicidal ideation)   Patient denies current fears or concerns for personal safety.   Patient denies current or recent suicidal ideation or behaviors.   Patient denies current or recent homicidal ideation or behaviors.   Patient denies current or recent self injurious behavior or ideation.   Patient denies other safety concerns.   Patient  reports there has been no change in risk factors since their last session.     Patient reports there has been no change in protective factors since their last session.     Recommended that patient call 911 or go to the local ED should there be a change in any of these risk factors.     Appearance:   Appropriate    Eye Contact:   Good    Psychomotor Behavior: Normal    Attitude:   Cooperative  Friendly Attentive   Orientation:   All   Speech    Rate / Production: Normal/ Responsive    Volume:  Normal    Mood:    Anxious  Normal client appeared nervous   Affect:    Appropriate    Thought Content:  Clear    Thought Form:  Coherent  Logical    Insight:    Good      Medication Review:   No changes to current psychiatric medication(s)     Medication Compliance:   Yes     Changes in Health Issues:   None reported     Chemical Use Review:   Substance Use: Chemical use reviewed, no active concerns identified      Tobacco Use: No current tobacco use.      Diagnosis:  1. KELSEA (generalized anxiety disorder)        Collateral Reports Completed:   Not Applicable    PLAN: (Patient Tasks / Therapist Tasks / Other)  Client will focus on what is within her control, take actions that uphold her values and return for therapy in two weeks.        Zahra CASAS, [facility] 7/9/2020  Note reviewed and clinical supervision by YESSENIA Hancock [facility] 7/27/2020   ______________________________________________________________________    Treatment Plan    Patient's Name: Arlene Cutler  YOB: 1995    Date: 5/8/2020    DSM5 Diagnoses: 300.02 (F41.1) Generalized Anxiety Disorder  Psychosocial / Contextual Factors: Client reported ongoing anxiety throughout her life that is exacerbated by her mother's illness and her stress while pursuing her degree  WHODAS: assigned on [facility]    Referral / Collaboration:  Referral to another professional/service is not indicated at this time..    Anticipated number of session or this episode  of care: 5-8      MeasurableTreatment Goal(s) related to diagnosis / functional impairment(s)  Goal 1: Patient will reduce frequency, duration, and intensity of racing thoughts as evidenced by client reporting daily use of effective coping skill over the next three months.    I will know I've met my goal when it's a habit.      Objective #A (Patient Action)    Patient will Improve concentration, focus, and mindfulness in daily activities .  Status: New - Date: 5/8/2020     Intervention(s)  Therapist will teach mindfulness skills to increase awareness and ability to be present .    Objective #B  Patient will identify and address phsyical presentation of anxiety.  Status: New - Date: 5/8/2020     Intervention(s)  Therapist will teach emotional recognition/identification. DBT emotion identification module, identifying physical symptoms.    Objective #C  Patient will Identify negative self-talk and behaviors: challenge core beliefs, myths, and actions.  Status: New - Date: 5/8/2020     Intervention(s)  Therapist will teach of CBT core belief systems, identify how core beliefs contribute to her anxiety.      Goal 2: Patient will reduce irritability when stressed as evidenced by client reporting use of three new effective communication skills and increased empathy over the next three months.    I will know I've met my goal when I can handle problems with better strategies.      Objective #A (Patient Action)    Status: New - Date: 5/8/2020     Patient will identify cause of irritabilty.    Intervention(s)  Therapist will teach model for understanding emotion.    Objective #B  Patient will use opposite action to reduce irritability.    Status: New - Date: 5/8/2020     Intervention(s)  Therapist will teach opposite action module.    Objective #C  Patient will learn & utilize at least 3 assertive communication skills weekly.  Status: New - Date: 5/8/2020     Intervention(s)  Therapist will teach DEAR MAN, GIVE and FAST DBT  skills.      Goal 3: Patiient will improve quality of sleep as evidenced by client reporting an average of 8 hours a night of quality sleep reported through her FitBit and maintinang a  consistent schedule.    I will know I've met my goal when I have more consistent sleep scores.      Objective #A (Patient Action)    Status: New - Date: 5/8/2020     Patient will identify 4 sleep hygiene practices.    Intervention(s)  Therapist will teach DBT sleep hygiene protocol.    Objective #B  Patient will identify and maintain motivation to sleep.    Status: New - Date: 5/8/2020     Intervention(s)  Therapist will teach motivational interviewing techniques .    Objective #C  Patient will relaxation exercises.  Status: New - Date: 5/8/2020     Intervention(s)  Therapist will teach self soothing DBT skills.      Patient has reviewed and agreed to the above plan.      Zahra CASAS, LGSW May 8, 2020  Note reviewed and clinical supervision by YESSENIA Hancock [facility] 6/22/2020

## 2020-08-14 ENCOUNTER — VIRTUAL VISIT (OUTPATIENT)
Dept: PSYCHOLOGY | Facility: CLINIC | Age: 25
End: 2020-08-14
Payer: COMMERCIAL

## 2020-08-14 DIAGNOSIS — F41.1 GAD (GENERALIZED ANXIETY DISORDER): Primary | ICD-10-CM

## 2020-08-14 PROCEDURE — 90832 PSYTX W PT 30 MINUTES: CPT | Mod: GT | Performed by: SOCIAL WORKER

## 2020-08-14 NOTE — PROGRESS NOTES
Discharge Summary  Multiple Sessions    Client Name: Arlene Cutler MRN#: [id number] YOB: 1995    Discharge Date:   August 14, 2020    Service Modality: Video Visit:    Telemedicine Visit: The patient's condition can be safely assessed and treated via synchronous audio and visual telemedicine encounter.      Reason for Telemedicine Visit: Services only offered telehealth    Originating Site (Patient Location): Patient's home    Distant Site (Provider Location): Provider Remote Setting home office    Consent:  The patient/guardian has verbally consented to: the potential risks and benefits of telemedicine (video visit) versus in person care; bill my insurance or make self-payment for services provided; and responsibility for payment of non-covered services.     Patient would like the video invitation sent by: Send to e-mail at: [email or web address]     Mode of Communication:  Video Conference via [employer]    As the provider I attest to compliance with applicable laws and regulations related to telemedicine.    Service Type: Individual      Session Start Time: 1:00  Session End Time: 1:20      Session Length: 20 - 30     Session #: 5     Attendees: Client attended alone      Focus of Treatment Objective(s):  Client's presenting concerns included: Anxiety - client reported struggling with racing thoughts and worry  Stage of Change at time of Discharge: MAINTENANCE (Working to maintain change, with risk of relapse) client reported greatly reduced racing thoughts, consistent use of coping skills and finding useful resources on her own    Medication Adherence:  Yes    Chemical Use:  Yes    Assessment: Current Emotional / Mental Status (status of significant symptoms):    Risk status (Self / Other harm or suicidal ideation)  Client denies current fears or concerns for personal safety.  Client denies current or recent suicidal ideation or behaviors.  Client denies current or  recent homicidal ideation or behaviors.  Client denies current or recent self injurious behavior or ideation.  Client denies other safety concerns.  A safety and risk management plan has not been developed at this time, however client was given the after-hours number should there be a change in any of these risk factors.    Appearance:   Appropriate   Eye Contact:   Good   Psychomotor Behavior: Normal   Attitude:   Cooperative  Friendly Pleasant  Orientation:   All  Speech   Rate / Production: Normal/ Responsive   Volume:  Normal   Mood:    Normal  Affect:    Appropriate   Thought Content:  Clear   Thought Form:  Coherent  Logical   Insight:   Good     DSM5 Diagnoses: (Sustained by DSM5 Criteria Listed Above)  Diagnoses: 300.02 (F41.1) Generalized Anxiety Disorder, in remission  Psychosocial & Contextual Factors: Client reported getting a new job and looking forward to starting  WHODAS 2.0 (12 item) Score: 0    Reason for Discharge:  Client is satisfied with progress and Goals completed  Reviewed treatment plan and client identified meeting all of her goals.     Aftercare Plan:  Client may resume counseling services at any time in the future by calling the [facility] Intake Office, [phone]. Client will continue finding helpful resources on her own and using coping skills.      Zahra CASAS, LGSW August 14, 2020  Note reviewed and clinical supervision by YESSENIA Hancock [facility] 8/19/2020

## 2020-09-21 DIAGNOSIS — A60.00 RECURRENT GENITAL HERPES: ICD-10-CM

## 2020-09-22 RX ORDER — VALACYCLOVIR HYDROCHLORIDE 500 MG/1
TABLET, FILM COATED ORAL
Qty: 90 TABLET | Refills: 1 | Status: SHIPPED | OUTPATIENT
Start: 2020-09-22 | End: 2021-03-26

## 2020-09-22 NOTE — TELEPHONE ENCOUNTER
Valtrex    Routing refill request to provider for review/approval because:  Labs not current:  JUAN DAVID CHEWN, RN, PHN

## 2021-01-15 ENCOUNTER — HEALTH MAINTENANCE LETTER (OUTPATIENT)
Age: 26
End: 2021-01-15

## 2021-03-12 ENCOUNTER — TELEPHONE (OUTPATIENT)
Dept: INTERNAL MEDICINE | Facility: CLINIC | Age: 26
End: 2021-03-12

## 2021-03-12 DIAGNOSIS — Z11.1 SCREENING EXAMINATION FOR PULMONARY TUBERCULOSIS: Primary | ICD-10-CM

## 2021-03-12 DIAGNOSIS — Z11.1 SCREENING EXAMINATION FOR PULMONARY TUBERCULOSIS: ICD-10-CM

## 2021-03-12 PROCEDURE — 86481 TB AG RESPONSE T-CELL SUSP: CPT | Performed by: INTERNAL MEDICINE

## 2021-03-12 PROCEDURE — 36415 COLL VENOUS BLD VENIPUNCTURE: CPT | Performed by: INTERNAL MEDICINE

## 2021-03-12 NOTE — TELEPHONE ENCOUNTER
CL, pt here for TB screening for work, needs quantiferon orders, please auth if appropriate, order pended    Jael Alford, CMA

## 2021-03-14 LAB
GAMMA INTERFERON BACKGROUND BLD IA-ACNC: 0 IU/ML
M TB IFN-G CD4+ BCKGRND COR BLD-ACNC: 10 IU/ML
M TB TUBERC IFN-G BLD QL: NEGATIVE
MITOGEN IGNF BCKGRD COR BLD-ACNC: 0.04 IU/ML
MITOGEN IGNF BCKGRD COR BLD-ACNC: 0.04 IU/ML

## 2021-03-25 DIAGNOSIS — A60.00 RECURRENT GENITAL HERPES: ICD-10-CM

## 2021-03-26 RX ORDER — VALACYCLOVIR HYDROCHLORIDE 500 MG/1
TABLET, FILM COATED ORAL
Qty: 90 TABLET | Refills: 1 | Status: SHIPPED | OUTPATIENT
Start: 2021-03-26 | End: 2021-06-23

## 2021-05-16 ENCOUNTER — HEALTH MAINTENANCE LETTER (OUTPATIENT)
Age: 26
End: 2021-05-16

## 2021-06-13 DIAGNOSIS — F41.1 GAD (GENERALIZED ANXIETY DISORDER): ICD-10-CM

## 2021-06-15 RX ORDER — SERTRALINE HYDROCHLORIDE 25 MG/1
TABLET, FILM COATED ORAL
Qty: 90 TABLET | Refills: 3 | OUTPATIENT
Start: 2021-06-15

## 2021-06-15 RX ORDER — SERTRALINE HYDROCHLORIDE 25 MG/1
25 TABLET, FILM COATED ORAL DAILY
Qty: 90 TABLET | Refills: 0 | Status: SHIPPED | OUTPATIENT
Start: 2021-06-15 | End: 2021-06-23

## 2021-06-15 ASSESSMENT — PATIENT HEALTH QUESTIONNAIRE - PHQ9: SUM OF ALL RESPONSES TO PHQ QUESTIONS 1-9: 2

## 2021-06-15 NOTE — TELEPHONE ENCOUNTER
She is overdue for her annual exam and mood follow-up . Please ask her to schedule this appointment ASAP. Can refill medication temporarily if she anticipates running out prior to scheduled appointment.     Thank you.

## 2021-06-15 NOTE — TELEPHONE ENCOUNTER
Sertraline   Routing refill request to provider for review/approval because:  Patient needs to be seen because it has been more than 1 year since last office visit.

## 2021-06-15 NOTE — TELEPHONE ENCOUNTER
Patient informed.     PHQ-9 score:    PHQ 6/15/2021   PHQ-9 Total Score 2   Q9: Thoughts of better off dead/self-harm past 2 weeks Not at all       Next 5 appointments (look out 90 days)    Jun 23, 2021  4:00 PM  PHYSICAL with Bri To MD  [facility] [address] [address] [phone]        Please send kailey to pharmacy as appropriate.    Radha CHEWN, RN, PHN

## 2021-06-23 ENCOUNTER — OFFICE VISIT (OUTPATIENT)
Dept: INTERNAL MEDICINE | Facility: CLINIC | Age: 26
End: 2021-06-23
Payer: COMMERCIAL

## 2021-06-23 VITALS
HEART RATE: 65 BPM | RESPIRATION RATE: 16 BRPM | HEIGHT: 65 IN | WEIGHT: 140 LBS | DIASTOLIC BLOOD PRESSURE: 70 MMHG | TEMPERATURE: 98.2 F | BODY MASS INDEX: 23.32 KG/M2 | OXYGEN SATURATION: 99 % | SYSTOLIC BLOOD PRESSURE: 118 MMHG

## 2021-06-23 DIAGNOSIS — F41.1 GAD (GENERALIZED ANXIETY DISORDER): ICD-10-CM

## 2021-06-23 DIAGNOSIS — A60.00 RECURRENT GENITAL HERPES: ICD-10-CM

## 2021-06-23 DIAGNOSIS — Z00.00 ROUTINE HISTORY AND PHYSICAL EXAMINATION OF ADULT: Primary | ICD-10-CM

## 2021-06-23 PROBLEM — R87.610 PAPANICOLAOU SMEAR OF CERVIX WITH ATYPICAL SQUAMOUS CELLS OF UNDETERMINED SIGNIFICANCE (ASC-US): Status: RESOLVED | Noted: 2018-01-23 | Resolved: 2021-06-23

## 2021-06-23 PROCEDURE — 99395 PREV VISIT EST AGE 18-39: CPT | Performed by: INTERNAL MEDICINE

## 2021-06-23 RX ORDER — SERTRALINE HYDROCHLORIDE 25 MG/1
25 TABLET, FILM COATED ORAL DAILY
Qty: 90 TABLET | Refills: 3 | Status: SHIPPED | OUTPATIENT
Start: 2021-06-23 | End: 2022-07-19

## 2021-06-23 RX ORDER — VALACYCLOVIR HYDROCHLORIDE 500 MG/1
500 TABLET, FILM COATED ORAL DAILY
Qty: 90 TABLET | Refills: 3 | Status: SHIPPED | OUTPATIENT
Start: 2021-06-23

## 2021-06-23 SDOH — HEALTH STABILITY: MENTAL HEALTH: HOW OFTEN DO YOU HAVE 6 OR MORE DRINKS ON ONE OCCASION?: NEVER

## 2021-06-23 SDOH — ECONOMIC STABILITY: TRANSPORTATION INSECURITY
IN THE PAST 12 MONTHS, HAS LACK OF TRANSPORTATION KEPT YOU FROM MEETINGS, WORK, OR FROM GETTING THINGS NEEDED FOR DAILY LIVING?: NOT ASKED

## 2021-06-23 SDOH — HEALTH STABILITY: MENTAL HEALTH: HOW OFTEN DO YOU HAVE A DRINK CONTAINING ALCOHOL?: 2-3 TIMES A WEEK

## 2021-06-23 SDOH — ECONOMIC STABILITY: FOOD INSECURITY: WITHIN THE PAST 12 MONTHS, YOU WORRIED THAT YOUR FOOD WOULD RUN OUT BEFORE YOU GOT MONEY TO BUY MORE.: NOT ASKED

## 2021-06-23 SDOH — HEALTH STABILITY: MENTAL HEALTH: HOW MANY STANDARD DRINKS CONTAINING ALCOHOL DO YOU HAVE ON A TYPICAL DAY?: 1 OR 2

## 2021-06-23 SDOH — ECONOMIC STABILITY: TRANSPORTATION INSECURITY
IN THE PAST 12 MONTHS, HAS THE LACK OF TRANSPORTATION KEPT YOU FROM MEDICAL APPOINTMENTS OR FROM GETTING MEDICATIONS?: NOT ASKED

## 2021-06-23 SDOH — ECONOMIC STABILITY: INCOME INSECURITY: HOW HARD IS IT FOR YOU TO PAY FOR THE VERY BASICS LIKE FOOD, HOUSING, MEDICAL CARE, AND HEATING?: NOT ASKED

## 2021-06-23 SDOH — ECONOMIC STABILITY: FOOD INSECURITY: WITHIN THE PAST 12 MONTHS, THE FOOD YOU BOUGHT JUST DIDN'T LAST AND YOU DIDN'T HAVE MONEY TO GET MORE.: NOT ASKED

## 2021-06-23 ASSESSMENT — MIFFLIN-ST. JEOR: SCORE: 1375.92

## 2021-06-23 NOTE — PROGRESS NOTES
ASSESSMENT/PLAN                                                       (Z00.00) Routine history and physical examination of adult  (primary encounter diagnosis)  Comment: PMH, PSH, FH, SH, medications, allergies, immunizations, and preventative health measures reviewed and updated as appropriate.    (A60.00) Recurrent genital herpes  Comment: well-controlled on current regimen.    Plan: continue present management; refills provided.     (F41.1) KELSEA (generalized anxiety disorder)  Comment: well-controlled on current regimen.    Plan: continue present management; refills provided.     Bri To MD   [address] [address]  T: [phone], F: [phone]    SUBJECTIVE                                                      Arlene Cutler is a very pleasant 25 year old female who presents for a physical.    ROS:  Constitutional: no unintentional weight loss or gain reported; no fevers, chills, or sweats reported  Cardiovascular: no chest pain, palpitations, or edema reported  Respiratory: no cough, wheezing, shortness of breath, or dyspnea on exertion reported  Gastrointestinal: no nausea, vomiting, constipation, diarrhea, or abdominal pain reported  Genitourinary: no urinary frequency, urgency, dysuria, or hematuria reported  Integumentary: no rash or pruritus reported  Musculoskeletal: no back pain, muscle pain, joint pain, or joint swelling reported  Neurologic: no focal weakness, numbness, or tingling reported  Hematologic: no easy bruising or bleeding reported  Endocrine: no heat or cold intolerance reported; no polyuria or polydipsia reported  Psychiatric: see PMH below    Past Medical History:   Diagnosis Date     KELSEA (generalized anxiety disorder)      Recurrent genital herpes      Past Surgical History:   Procedure Laterality Date     NO HISTORY OF SURGERY       Family History   Problem Relation Age of Onset     Diabetes Type 2  Maternal Grandmother          Diet controlled     Hypertension Maternal Grandfather      Hyperlipidemia Paternal Grandmother      Pacemaker Paternal Grandfather      Colon Cancer Paternal Grandfather      Breast Cancer Maternal Aunt      Breast Cancer Mother         post-menopausal     Myocardial Infarction No family hx of      Cerebrovascular Disease No family hx of      Coronary Artery Disease Early Onset No family hx of      Ovarian Cancer No family hx of      Social History     Occupational History     Occupation: Homecare - [employer]   Tobacco Use     Smoking status: Never Smoker     Smokeless tobacco: Never Used   Substance and Sexual Activity     Alcohol use: Yes     Frequency: 2-3 times a week     Drinks per session: 1 or 2     Binge frequency: Never     Drug use: No     Sexual activity: Yes     Partners: Male     Birth control/protection: Implant     Comment: Nexplanon   Social History Narrative    In a relationship.    4 days/week - gym.      No Known Allergies     Current Outpatient Medications   Medication Sig     adapalene (DIFFERIN) 0.1 % gel      lidocaine (XYLOCAINE) 5 % external ointment Apply topically 3 times daily     sertraline (ZOLOFT) 25 MG tablet Take 1 tablet (25 mg) by mouth daily     valACYclovir (VALTREX) 500 MG tablet Take 1 tablet (500 mg) by mouth daily     Immunization History   Administered Date(s) Administered     COVID-19,PF,Moderna 12/30/2020, 01/27/2021     DTAP (<7y) 09/03/1999     HEPA 08/23/2012, 04/15/2013     HPV 01/31/2013, 04/15/2013, 08/09/2013     HepB 1994, 01/19/1995, 06/15/1995     Historical DTP/aP 1994, 01/19/1995     Influenza (IIV3) PF 02/19/2007     Influenza (intradermal) 09/19/2019     Influenza Vaccine IM > 6 months Valent IIV4 01/23/2018, 01/25/2019     Influenza Vaccine, 6+MO IM (QUADRIVALENT W/PRESERVATIVES) 01/10/2017     MMR 12/15/1995, 09/03/1999     Mantoux Tuberculin Skin Test 01/25/2019     Meningococcal (Menactra ) 02/19/2007, 08/23/2012     OPV, trivalent, live 1994,  "01/19/1995, 06/05/1995, 09/03/1999     TDAP Vaccine (Adacel) 08/23/2012     TDAP Vaccine (Boostrix) 02/19/2007     Tetramune (DtP/HIB) 03/16/1995, 12/15/1995     Varicella Pt Report Hx of Varicella/Chicken Pox 01/20/2000     PREVENTATIVE HEALTH                                                      BMI: within normal limits   Blood pressure: within normal limits   Breast CA screening: not medically indicated at this time   Cervical CA screening: up to date   Colon CA screening: not medically indicated at this time   Lung CA screening: n/a   Dexa: not medically indicated at this time   Screening cholesterol: not medically indicated at this time   Screening diabetes: not medically indicated at this time   STD testing: STD testing offered - declined by patient  Alcohol misuse screening: alcohol use reviewed - no intervention indicated at this time  Immunizations: reviewed; up to date     OBJECTIVE                                                      /70 (BP Location: Left arm, Patient Position: Chair, Cuff Size: Adult Regular)   Pulse 65   Temp 98.2  F (36.8  C) (Temporal)   Resp 16   Ht 1.651 m (5' 5\")   Wt 63.5 kg (140 lb)   SpO2 99%   BMI 23.30 kg/m    Constitutional: well-appearing  Head, Ears, and Eyes: normocephalic; normal external auditory canal and pinna; tympanic membranes visualized and normal; normal lids and conjunctivae  Neck: supple, symmetric, no thyromegaly or lymphadenopathy  Respiratory: normal respiratory effort; clear to auscultation bilaterally  Cardiovascular: regular rate and rhythm; no edema  Gastrointestinal: soft, non-tender, and non-distended; no organomegaly or masses  Musculoskeletal: normal gait and station  Psych: normal judgment and insight; normal mood and affect; recent and remote memory intact    ---  (Note was completed, in part, with [employer] voice-recognition software. Documentation was reviewed, but some grammatical, spelling, and word errors may remain.)    "

## 2021-09-05 ENCOUNTER — HEALTH MAINTENANCE LETTER (OUTPATIENT)
Age: 26
End: 2021-09-05

## 2021-10-06 ENCOUNTER — OFFICE VISIT (OUTPATIENT)
Dept: INTERNAL MEDICINE | Facility: CLINIC | Age: 26
End: 2021-10-06
Payer: COMMERCIAL

## 2021-10-06 VITALS
BODY MASS INDEX: 23.96 KG/M2 | OXYGEN SATURATION: 99 % | RESPIRATION RATE: 18 BRPM | WEIGHT: 144 LBS | SYSTOLIC BLOOD PRESSURE: 124 MMHG | HEART RATE: 72 BPM | TEMPERATURE: 97.3 F | DIASTOLIC BLOOD PRESSURE: 70 MMHG

## 2021-10-06 DIAGNOSIS — Z23 NEED FOR PROPHYLACTIC VACCINATION AND INOCULATION AGAINST INFLUENZA: ICD-10-CM

## 2021-10-06 DIAGNOSIS — R55 SYNCOPE, UNSPECIFIED SYNCOPE TYPE: Primary | ICD-10-CM

## 2021-10-06 LAB
ALBUMIN SERPL-MCNC: 4.2 G/DL (ref 3.4–5)
ALP SERPL-CCNC: 59 U/L (ref 40–150)
ALT SERPL W P-5'-P-CCNC: 14 U/L (ref 0–50)
ANION GAP SERPL CALCULATED.3IONS-SCNC: 5 MMOL/L (ref 3–14)
AST SERPL W P-5'-P-CCNC: 8 U/L (ref 0–45)
BILIRUB SERPL-MCNC: 1.8 MG/DL (ref 0.2–1.3)
BUN SERPL-MCNC: 10 MG/DL (ref 7–30)
CALCIUM SERPL-MCNC: 9.5 MG/DL (ref 8.5–10.1)
CHLORIDE BLD-SCNC: 109 MMOL/L (ref 94–109)
CO2 SERPL-SCNC: 26 MMOL/L (ref 20–32)
CREAT SERPL-MCNC: 0.75 MG/DL (ref 0.52–1.04)
ERYTHROCYTE [DISTWIDTH] IN BLOOD BY AUTOMATED COUNT: 11.7 % (ref 10–15)
FERRITIN SERPL-MCNC: 33 NG/ML (ref 12–150)
GFR SERPL CREATININE-BSD FRML MDRD: >90 ML/MIN/1.73M2
GLUCOSE BLD-MCNC: 86 MG/DL (ref 70–99)
HCT VFR BLD AUTO: 39.9 % (ref 35–47)
HGB BLD-MCNC: 13.3 G/DL (ref 11.7–15.7)
IRON SATN MFR SERPL: 42 % (ref 15–46)
IRON SERPL-MCNC: 138 UG/DL (ref 35–180)
MAGNESIUM SERPL-MCNC: 2.1 MG/DL (ref 1.6–2.3)
MCH RBC QN AUTO: 32.3 PG (ref 26.5–33)
MCHC RBC AUTO-ENTMCNC: 33.3 G/DL (ref 31.5–36.5)
MCV RBC AUTO: 97 FL (ref 78–100)
PHOSPHATE SERPL-MCNC: 3.4 MG/DL (ref 2.5–4.5)
PLATELET # BLD AUTO: 224 10E3/UL (ref 150–450)
POTASSIUM BLD-SCNC: 4.3 MMOL/L (ref 3.4–5.3)
PROT SERPL-MCNC: 7.6 G/DL (ref 6.8–8.8)
RBC # BLD AUTO: 4.12 10E6/UL (ref 3.8–5.2)
SODIUM SERPL-SCNC: 140 MMOL/L (ref 133–144)
TIBC SERPL-MCNC: 325 UG/DL (ref 240–430)
TSH SERPL DL<=0.005 MIU/L-ACNC: 1.15 MU/L (ref 0.4–4)
WBC # BLD AUTO: 5.4 10E3/UL (ref 4–11)

## 2021-10-06 PROCEDURE — 99213 OFFICE O/P EST LOW 20 MIN: CPT | Mod: 25 | Performed by: INTERNAL MEDICINE

## 2021-10-06 PROCEDURE — 90471 IMMUNIZATION ADMIN: CPT | Performed by: INTERNAL MEDICINE

## 2021-10-06 PROCEDURE — 80053 COMPREHEN METABOLIC PANEL: CPT | Performed by: INTERNAL MEDICINE

## 2021-10-06 PROCEDURE — 85027 COMPLETE CBC AUTOMATED: CPT | Performed by: INTERNAL MEDICINE

## 2021-10-06 PROCEDURE — 84443 ASSAY THYROID STIM HORMONE: CPT | Performed by: INTERNAL MEDICINE

## 2021-10-06 PROCEDURE — 90686 IIV4 VACC NO PRSV 0.5 ML IM: CPT | Performed by: INTERNAL MEDICINE

## 2021-10-06 PROCEDURE — 83735 ASSAY OF MAGNESIUM: CPT | Performed by: INTERNAL MEDICINE

## 2021-10-06 PROCEDURE — 82728 ASSAY OF FERRITIN: CPT | Performed by: INTERNAL MEDICINE

## 2021-10-06 PROCEDURE — 83550 IRON BINDING TEST: CPT | Performed by: INTERNAL MEDICINE

## 2021-10-06 PROCEDURE — 84100 ASSAY OF PHOSPHORUS: CPT | Performed by: INTERNAL MEDICINE

## 2021-10-06 PROCEDURE — 36415 COLL VENOUS BLD VENIPUNCTURE: CPT | Performed by: INTERNAL MEDICINE

## 2021-10-06 PROCEDURE — 93000 ELECTROCARDIOGRAM COMPLETE: CPT | Performed by: INTERNAL MEDICINE

## 2021-10-06 NOTE — PROGRESS NOTES
ASSESSMENT/PLAN                                                      (R55) Syncope, unspecified syncope type  (primary encounter diagnosis)  Comment: episodes consistent with vasovagal syncope, but will evaluate for other potential organic causes of/contributors to syncope.  Plan: EKG and nonfasting labs today; patient encouraged to avoid situations in which she is overheated, overstressed, dehydrated, afraid, or standing for prolonged periods; she should let others know when she is feeling presyncopal so that they can keep her safe if she has a syncopal episode.     (Z23) Need for prophylactic vaccination and inoculation against influenza  Plan: flu shot given today.    Bri To MD   [facility]  [address] [place] [address]  T: [phone], F: [phone]    SUBJECTIVE                                                      Arlene Cutler is a very pleasant 26 year old female who presents for further evaluation of recurrent syncopal episodes:    Patient's last syncopal episode occurred 2 weeks ago while at a concert. Patient had to walk a long distance to reach the concert, with standing for prolonged period, and was overheating due to the ambient temperature. She felt herself becoming presyncopal prior to her syncopal episode and was able to warn her sister that it was about to happen.  Patient was unconscious for less than a minute and was evaluated by EMS at the scene.    Patient has had multiple syncopal episodes over the last several years.  These are often related to her feeling overheated or overstressed. They sometimes are related to prolonged standing, dehydration, and fear. Her syncopal episodes have all been preceded by presyncopal symptoms and are brief in duration - less than 1 minute. She always wakes up lucid and oriented. No tongue biting or incontinence.    OBJECTIVE                                                      /70 (BP Location: Left arm, Patient  Position: Chair, Cuff Size: Adult Regular)   Pulse 72   Temp 97.3  F (36.3  C) (Temporal)   Resp 18   Wt 65.3 kg (144 lb)   SpO2 99%   BMI 23.96 kg/m    Constitutional: well-appearing  Respiratory: normal respiratory effort; clear to auscultation bilaterally  Cardiovascular: regular rate and rhythm; no edema  Musculoskeletal: normal gait and station  Psych: normal judgment and insight; normal mood and affect; recent and remote memory intact    ---    (Note documentation was completed, in part, with [employer] voice-recognition software. Documentation was reviewed, but some grammatical, spelling, and word errors may remain.)

## 2022-02-02 ENCOUNTER — VIRTUAL VISIT (OUTPATIENT)
Dept: INTERNAL MEDICINE | Facility: CLINIC | Age: 27
End: 2022-02-02
Payer: COMMERCIAL

## 2022-02-02 DIAGNOSIS — F41.1 GAD (GENERALIZED ANXIETY DISORDER): Primary | ICD-10-CM

## 2022-02-02 PROCEDURE — 99214 OFFICE O/P EST MOD 30 MIN: CPT | Mod: 95 | Performed by: INTERNAL MEDICINE

## 2022-02-02 RX ORDER — BUSPIRONE HYDROCHLORIDE 5 MG/1
TABLET ORAL
Qty: 463 TABLET | Refills: 0 | Status: SHIPPED | OUTPATIENT
Start: 2022-02-02 | End: 2022-10-17

## 2022-02-02 NOTE — PROGRESS NOTES
TELEPHONE VISIT                                                      ASSESSMENT/PLAN                                                      (F41.1) KELSEA (generalized anxiety disorder)  (primary encounter diagnosis)  Comment: Zoloft may be causing or contributing to patient's low libido.  Plan: TRIAL of BuSpar to replace Zoloft; BuSpar taper up prescribed while patient tapers off of Zoloft; mood follow-up in 6-8 weeks (earlier as needed; virtual visit okay).    Total time of call between patient and provider was 5 minutes. Provider location: office. Patient location: home.    Bri To MD   [address] [address]  T: [phone], F: [phone]    SUBJECTIVE                                                      Arlene Cutler is a very pleasant 26 year old female who requested a telephone visit to discuss her medication:    Patient is currently on Zoloft 25 mg daily for anxiety. Generally tolerating well but complains of low libido. Anxiety is more or less well controlled. Interested in trying a different medication for anxiety.    ---    (Note was completed, in part, with [employer] voice-recognition software. Documentation reviewed, but some grammatical, spelling, and word errors may remain.)

## 2022-03-12 ENCOUNTER — MYC MEDICAL ADVICE (OUTPATIENT)
Dept: INTERNAL MEDICINE | Facility: CLINIC | Age: 27
End: 2022-03-12
Payer: COMMERCIAL

## 2022-07-18 DIAGNOSIS — F41.1 GAD (GENERALIZED ANXIETY DISORDER): ICD-10-CM

## 2022-07-19 RX ORDER — SERTRALINE HYDROCHLORIDE 25 MG/1
25 TABLET, FILM COATED ORAL DAILY
Qty: 90 TABLET | Refills: 3 | Status: SHIPPED | OUTPATIENT
Start: 2022-07-19 | End: 2022-10-17

## 2022-08-07 ENCOUNTER — HEALTH MAINTENANCE LETTER (OUTPATIENT)
Age: 27
End: 2022-08-07

## 2022-10-12 NOTE — LETTER
[facility]  [address] [id number] [phone]            Arlene Link Omayra  [address]  [facility] [address]        December 26, 2017    Dear Arlene,    While refilling your prescription today, we noticed that you are due for an appointment with your provider.  We will refill your prescription for 30 days, but a follow-up appointment must be made before any additional refills can be approved.     Taking care of your health is important to us and we look forward to seeing you in the near future.  Please call us at [phone] or [phone] (or use [employer]) to schedule an appointment.     Please disregard this notice if you have already made an appointment.    Sincerely,        [facility]   No

## 2022-10-16 ENCOUNTER — E-VISIT (OUTPATIENT)
Dept: INTERNAL MEDICINE | Facility: CLINIC | Age: 27
End: 2022-10-16
Payer: COMMERCIAL

## 2022-10-16 DIAGNOSIS — F41.1 GAD (GENERALIZED ANXIETY DISORDER): Primary | ICD-10-CM

## 2022-10-16 PROCEDURE — 99421 OL DIG E/M SVC 5-10 MIN: CPT | Performed by: INTERNAL MEDICINE

## 2022-10-17 ASSESSMENT — ANXIETY QUESTIONNAIRES
2. NOT BEING ABLE TO STOP OR CONTROL WORRYING: MORE THAN HALF THE DAYS
8. IF YOU CHECKED OFF ANY PROBLEMS, HOW DIFFICULT HAVE THESE MADE IT FOR YOU TO DO YOUR WORK, TAKE CARE OF THINGS AT HOME, OR GET ALONG WITH OTHER PEOPLE?: SOMEWHAT DIFFICULT
3. WORRYING TOO MUCH ABOUT DIFFERENT THINGS: NEARLY EVERY DAY
7. FEELING AFRAID AS IF SOMETHING AWFUL MIGHT HAPPEN: NOT AT ALL
GAD7 TOTAL SCORE: 10
1. FEELING NERVOUS, ANXIOUS, OR ON EDGE: NEARLY EVERY DAY
6. BECOMING EASILY ANNOYED OR IRRITABLE: SEVERAL DAYS
4. TROUBLE RELAXING: SEVERAL DAYS
7. FEELING AFRAID AS IF SOMETHING AWFUL MIGHT HAPPEN: NOT AT ALL
5. BEING SO RESTLESS THAT IT IS HARD TO SIT STILL: NOT AT ALL
GAD7 TOTAL SCORE: 10
GAD7 TOTAL SCORE: 10

## 2022-10-18 ASSESSMENT — ANXIETY QUESTIONNAIRES: GAD7 TOTAL SCORE: 10

## 2022-10-23 ENCOUNTER — HEALTH MAINTENANCE LETTER (OUTPATIENT)
Age: 27
End: 2022-10-23

## 2023-04-07 ENCOUNTER — E-VISIT (OUTPATIENT)
Dept: INTERNAL MEDICINE | Facility: CLINIC | Age: 28
End: 2023-04-07
Payer: COMMERCIAL

## 2023-04-07 DIAGNOSIS — F41.1 GAD (GENERALIZED ANXIETY DISORDER): ICD-10-CM

## 2023-04-07 PROCEDURE — 99207 PR NON-BILLABLE SERV PER CHARTING: CPT | Performed by: INTERNAL MEDICINE

## 2023-07-18 DIAGNOSIS — F41.1 GAD (GENERALIZED ANXIETY DISORDER): ICD-10-CM

## 2023-07-18 NOTE — LETTER
[facility]  [address] [place] [address]  [phone]  July 19, 2023  Arlene Cutler  [address] [facility] [address]        Dear Arlene,    We care about your health and based on a review of your medical records, recommend the following, to better manage your health; Annual Preventative Visit. We have attempted to reach you multiple times to schedule an appointment. Please contact our clinic to schedule at [phone].      Thank you,       [facility]

## 2023-08-27 ENCOUNTER — HEALTH MAINTENANCE LETTER (OUTPATIENT)
Age: 28
End: 2023-08-27

## 2024-10-20 ENCOUNTER — HEALTH MAINTENANCE LETTER (OUTPATIENT)
Age: 29
End: 2024-10-20